# Patient Record
Sex: MALE | Race: WHITE | NOT HISPANIC OR LATINO | Employment: FULL TIME | URBAN - METROPOLITAN AREA
[De-identification: names, ages, dates, MRNs, and addresses within clinical notes are randomized per-mention and may not be internally consistent; named-entity substitution may affect disease eponyms.]

---

## 2024-03-31 ENCOUNTER — ANESTHESIA EVENT (INPATIENT)
Dept: PERIOP | Facility: HOSPITAL | Age: 29
DRG: 343 | End: 2024-03-31
Payer: COMMERCIAL

## 2024-03-31 ENCOUNTER — HOSPITAL ENCOUNTER (INPATIENT)
Facility: HOSPITAL | Age: 29
LOS: 1 days | Discharge: HOME | DRG: 343 | End: 2024-04-01
Attending: EMERGENCY MEDICINE | Admitting: STUDENT IN AN ORGANIZED HEALTH CARE EDUCATION/TRAINING PROGRAM
Payer: COMMERCIAL

## 2024-03-31 ENCOUNTER — APPOINTMENT (EMERGENCY)
Dept: RADIOLOGY | Facility: HOSPITAL | Age: 29
DRG: 343 | End: 2024-03-31
Payer: COMMERCIAL

## 2024-03-31 ENCOUNTER — ANESTHESIA (INPATIENT)
Dept: PERIOP | Facility: HOSPITAL | Age: 29
DRG: 343 | End: 2024-03-31
Payer: COMMERCIAL

## 2024-03-31 DIAGNOSIS — K35.80 ACUTE APPENDICITIS: Primary | ICD-10-CM

## 2024-03-31 LAB
ALBUMIN SERPL BCP-MCNC: 4.4 G/DL (ref 3.5–5)
ALP SERPL-CCNC: 63 U/L (ref 34–104)
ALT SERPL W P-5'-P-CCNC: 40 U/L (ref 7–52)
ANION GAP SERPL CALCULATED.3IONS-SCNC: 10 MMOL/L (ref 4–13)
AST SERPL W P-5'-P-CCNC: 20 U/L (ref 13–39)
BASOPHILS # BLD AUTO: 0.03 THOUSANDS/ÂΜL (ref 0–0.1)
BASOPHILS NFR BLD AUTO: 0 % (ref 0–1)
BILIRUB SERPL-MCNC: 0.79 MG/DL (ref 0.2–1)
BILIRUB UR QL STRIP: NEGATIVE
BUN SERPL-MCNC: 11 MG/DL (ref 5–25)
CALCIUM SERPL-MCNC: 8.8 MG/DL (ref 8.4–10.2)
CHLORIDE SERPL-SCNC: 101 MMOL/L (ref 96–108)
CLARITY UR: CLEAR
CO2 SERPL-SCNC: 25 MMOL/L (ref 21–32)
COLOR UR: COLORLESS
CREAT SERPL-MCNC: 0.98 MG/DL (ref 0.6–1.3)
EOSINOPHIL # BLD AUTO: 0.24 THOUSAND/ÂΜL (ref 0–0.61)
EOSINOPHIL NFR BLD AUTO: 2 % (ref 0–6)
ERYTHROCYTE [DISTWIDTH] IN BLOOD BY AUTOMATED COUNT: 11.5 % (ref 11.6–15.1)
GFR SERPL CREATININE-BSD FRML MDRD: 103 ML/MIN/1.73SQ M
GLUCOSE SERPL-MCNC: 109 MG/DL (ref 65–140)
GLUCOSE UR STRIP-MCNC: NEGATIVE MG/DL
HCT VFR BLD AUTO: 47.7 % (ref 36.5–49.3)
HGB BLD-MCNC: 17 G/DL (ref 12–17)
HGB UR QL STRIP.AUTO: NEGATIVE
IMM GRANULOCYTES # BLD AUTO: 0.04 THOUSAND/UL (ref 0–0.2)
IMM GRANULOCYTES NFR BLD AUTO: 0 % (ref 0–2)
KETONES UR STRIP-MCNC: NEGATIVE MG/DL
LEUKOCYTE ESTERASE UR QL STRIP: NEGATIVE
LIPASE SERPL-CCNC: 16 U/L (ref 11–82)
LYMPHOCYTES # BLD AUTO: 1.63 THOUSANDS/ÂΜL (ref 0.6–4.47)
LYMPHOCYTES NFR BLD AUTO: 12 % (ref 14–44)
MCH RBC QN AUTO: 31.3 PG (ref 26.8–34.3)
MCHC RBC AUTO-ENTMCNC: 35.6 G/DL (ref 31.4–37.4)
MCV RBC AUTO: 88 FL (ref 82–98)
MONOCYTES # BLD AUTO: 0.92 THOUSAND/ÂΜL (ref 0.17–1.22)
MONOCYTES NFR BLD AUTO: 7 % (ref 4–12)
NEUTROPHILS # BLD AUTO: 11.04 THOUSANDS/ÂΜL (ref 1.85–7.62)
NEUTS SEG NFR BLD AUTO: 79 % (ref 43–75)
NITRITE UR QL STRIP: NEGATIVE
NRBC BLD AUTO-RTO: 0 /100 WBCS
PH UR STRIP.AUTO: 7 [PH]
PLATELET # BLD AUTO: 278 THOUSANDS/UL (ref 149–390)
PMV BLD AUTO: 9.1 FL (ref 8.9–12.7)
POTASSIUM SERPL-SCNC: 3.9 MMOL/L (ref 3.5–5.3)
PROT SERPL-MCNC: 7.1 G/DL (ref 6.4–8.4)
PROT UR STRIP-MCNC: NEGATIVE MG/DL
RBC # BLD AUTO: 5.44 MILLION/UL (ref 3.88–5.62)
SODIUM SERPL-SCNC: 136 MMOL/L (ref 135–147)
SP GR UR STRIP.AUTO: 1.01 (ref 1–1.03)
UROBILINOGEN UR STRIP-ACNC: <2 MG/DL
WBC # BLD AUTO: 13.9 THOUSAND/UL (ref 4.31–10.16)

## 2024-03-31 PROCEDURE — 99223 1ST HOSP IP/OBS HIGH 75: CPT | Performed by: STUDENT IN AN ORGANIZED HEALTH CARE EDUCATION/TRAINING PROGRAM

## 2024-03-31 PROCEDURE — 44970 LAPAROSCOPY APPENDECTOMY: CPT | Performed by: STUDENT IN AN ORGANIZED HEALTH CARE EDUCATION/TRAINING PROGRAM

## 2024-03-31 PROCEDURE — 85025 COMPLETE CBC W/AUTO DIFF WBC: CPT

## 2024-03-31 PROCEDURE — 74177 CT ABD & PELVIS W/CONTRAST: CPT

## 2024-03-31 PROCEDURE — 88304 TISSUE EXAM BY PATHOLOGIST: CPT | Performed by: STUDENT IN AN ORGANIZED HEALTH CARE EDUCATION/TRAINING PROGRAM

## 2024-03-31 PROCEDURE — 83690 ASSAY OF LIPASE: CPT

## 2024-03-31 PROCEDURE — 80053 COMPREHEN METABOLIC PANEL: CPT

## 2024-03-31 PROCEDURE — 36415 COLL VENOUS BLD VENIPUNCTURE: CPT

## 2024-03-31 PROCEDURE — 81003 URINALYSIS AUTO W/O SCOPE: CPT

## 2024-03-31 PROCEDURE — 99284 EMERGENCY DEPT VISIT MOD MDM: CPT

## 2024-03-31 PROCEDURE — 0DTJ4ZZ RESECTION OF APPENDIX, PERCUTANEOUS ENDOSCOPIC APPROACH: ICD-10-PCS | Performed by: STUDENT IN AN ORGANIZED HEALTH CARE EDUCATION/TRAINING PROGRAM

## 2024-03-31 PROCEDURE — 96365 THER/PROPH/DIAG IV INF INIT: CPT

## 2024-03-31 PROCEDURE — NC001 PR NO CHARGE: Performed by: STUDENT IN AN ORGANIZED HEALTH CARE EDUCATION/TRAINING PROGRAM

## 2024-03-31 PROCEDURE — 99285 EMERGENCY DEPT VISIT HI MDM: CPT | Performed by: EMERGENCY MEDICINE

## 2024-03-31 RX ORDER — LIDOCAINE HYDROCHLORIDE 20 MG/ML
INJECTION, SOLUTION EPIDURAL; INFILTRATION; INTRACAUDAL; PERINEURAL AS NEEDED
Status: DISCONTINUED | OUTPATIENT
Start: 2024-03-31 | End: 2024-04-01

## 2024-03-31 RX ORDER — ROCURONIUM BROMIDE 10 MG/ML
INJECTION, SOLUTION INTRAVENOUS AS NEEDED
Status: DISCONTINUED | OUTPATIENT
Start: 2024-03-31 | End: 2024-04-01

## 2024-03-31 RX ORDER — MAGNESIUM HYDROXIDE 1200 MG/15ML
LIQUID ORAL AS NEEDED
Status: DISCONTINUED | OUTPATIENT
Start: 2024-03-31 | End: 2024-04-01 | Stop reason: HOSPADM

## 2024-03-31 RX ORDER — FENTANYL CITRATE 50 UG/ML
INJECTION, SOLUTION INTRAMUSCULAR; INTRAVENOUS AS NEEDED
Status: DISCONTINUED | OUTPATIENT
Start: 2024-03-31 | End: 2024-04-01

## 2024-03-31 RX ORDER — METRONIDAZOLE 500 MG/100ML
500 INJECTION, SOLUTION INTRAVENOUS EVERY 8 HOURS
Status: DISCONTINUED | OUTPATIENT
Start: 2024-03-31 | End: 2024-04-01 | Stop reason: HOSPADM

## 2024-03-31 RX ORDER — MIDAZOLAM HYDROCHLORIDE 2 MG/2ML
INJECTION, SOLUTION INTRAMUSCULAR; INTRAVENOUS AS NEEDED
Status: DISCONTINUED | OUTPATIENT
Start: 2024-03-31 | End: 2024-04-01

## 2024-03-31 RX ORDER — PROPOFOL 10 MG/ML
INJECTION, EMULSION INTRAVENOUS CONTINUOUS PRN
Status: DISCONTINUED | OUTPATIENT
Start: 2024-03-31 | End: 2024-04-01

## 2024-03-31 RX ORDER — ONDANSETRON 2 MG/ML
4 INJECTION INTRAMUSCULAR; INTRAVENOUS EVERY 6 HOURS PRN
Status: DISCONTINUED | OUTPATIENT
Start: 2024-03-31 | End: 2024-04-01 | Stop reason: HOSPADM

## 2024-03-31 RX ORDER — SODIUM CHLORIDE, SODIUM LACTATE, POTASSIUM CHLORIDE, CALCIUM CHLORIDE 600; 310; 30; 20 MG/100ML; MG/100ML; MG/100ML; MG/100ML
INJECTION, SOLUTION INTRAVENOUS CONTINUOUS PRN
Status: DISCONTINUED | OUTPATIENT
Start: 2024-03-31 | End: 2024-04-01

## 2024-03-31 RX ORDER — SODIUM CHLORIDE, SODIUM GLUCONATE, SODIUM ACETATE, POTASSIUM CHLORIDE, MAGNESIUM CHLORIDE, SODIUM PHOSPHATE, DIBASIC, AND POTASSIUM PHOSPHATE .53; .5; .37; .037; .03; .012; .00082 G/100ML; G/100ML; G/100ML; G/100ML; G/100ML; G/100ML; G/100ML
1000 INJECTION, SOLUTION INTRAVENOUS ONCE
Status: COMPLETED | OUTPATIENT
Start: 2024-03-31 | End: 2024-03-31

## 2024-03-31 RX ORDER — HEPARIN SODIUM 5000 [USP'U]/ML
5000 INJECTION, SOLUTION INTRAVENOUS; SUBCUTANEOUS EVERY 8 HOURS SCHEDULED
Status: DISCONTINUED | OUTPATIENT
Start: 2024-03-31 | End: 2024-04-01 | Stop reason: HOSPADM

## 2024-03-31 RX ORDER — DEXAMETHASONE SODIUM PHOSPHATE 10 MG/ML
INJECTION, SOLUTION INTRAMUSCULAR; INTRAVENOUS AS NEEDED
Status: DISCONTINUED | OUTPATIENT
Start: 2024-03-31 | End: 2024-04-01

## 2024-03-31 RX ORDER — HYDROMORPHONE HCL/PF 1 MG/ML
0.5 SYRINGE (ML) INJECTION ONCE
Status: DISCONTINUED | OUTPATIENT
Start: 2024-03-31 | End: 2024-04-01 | Stop reason: HOSPADM

## 2024-03-31 RX ADMIN — PROPOFOL 30 MG: 10 INJECTION, EMULSION INTRAVENOUS at 23:42

## 2024-03-31 RX ADMIN — SODIUM CHLORIDE, SODIUM GLUCONATE, SODIUM ACETATE, POTASSIUM CHLORIDE, MAGNESIUM CHLORIDE, SODIUM PHOSPHATE, DIBASIC, AND POTASSIUM PHOSPHATE 1000 ML: .53; .5; .37; .037; .03; .012; .00082 INJECTION, SOLUTION INTRAVENOUS at 20:00

## 2024-03-31 RX ADMIN — DEXAMETHASONE SODIUM PHOSPHATE 10 MG: 10 INJECTION, SOLUTION INTRAMUSCULAR; INTRAVENOUS at 23:19

## 2024-03-31 RX ADMIN — FENTANYL CITRATE 50 MCG: 50 INJECTION INTRAMUSCULAR; INTRAVENOUS at 23:07

## 2024-03-31 RX ADMIN — LIDOCAINE HYDROCHLORIDE 100 MG: 20 INJECTION, SOLUTION EPIDURAL; INFILTRATION; INTRACAUDAL at 23:07

## 2024-03-31 RX ADMIN — PROPOFOL 200 MG: 10 INJECTION, EMULSION INTRAVENOUS at 23:07

## 2024-03-31 RX ADMIN — MIDAZOLAM 2 MG: 1 INJECTION INTRAMUSCULAR; INTRAVENOUS at 23:02

## 2024-03-31 RX ADMIN — IOHEXOL 100 ML: 350 INJECTION, SOLUTION INTRAVENOUS at 21:12

## 2024-03-31 RX ADMIN — FENTANYL CITRATE 50 MCG: 50 INJECTION INTRAMUSCULAR; INTRAVENOUS at 23:26

## 2024-03-31 RX ADMIN — ROCURONIUM BROMIDE 20 MG: 10 INJECTION, SOLUTION INTRAVENOUS at 23:41

## 2024-03-31 RX ADMIN — SODIUM CHLORIDE 8 MCG: 9 INJECTION, SOLUTION INTRAVENOUS at 23:12

## 2024-03-31 RX ADMIN — SODIUM CHLORIDE 8 MCG: 9 INJECTION, SOLUTION INTRAVENOUS at 23:22

## 2024-03-31 RX ADMIN — METRONIDAZOLE: 500 INJECTION, SOLUTION INTRAVENOUS at 23:25

## 2024-03-31 RX ADMIN — SODIUM CHLORIDE 8 MCG: 9 INJECTION, SOLUTION INTRAVENOUS at 23:45

## 2024-03-31 RX ADMIN — Medication 1000 MG: at 23:13

## 2024-03-31 RX ADMIN — PROPOFOL 80 MCG/KG/MIN: 10 INJECTION, EMULSION INTRAVENOUS at 23:13

## 2024-03-31 RX ADMIN — SODIUM CHLORIDE, SODIUM LACTATE, POTASSIUM CHLORIDE, AND CALCIUM CHLORIDE: .6; .31; .03; .02 INJECTION, SOLUTION INTRAVENOUS at 23:00

## 2024-03-31 RX ADMIN — ROCURONIUM BROMIDE 40 MG: 10 INJECTION, SOLUTION INTRAVENOUS at 23:07

## 2024-03-31 NOTE — ED PROVIDER NOTES
Chief Complaint   Patient presents with    Abdominal Pain     Pt reports RLQ pain since last night. -N/V/D/. States dizziness      History of Present Illness and Review of Systems   This is a 28 y.o. male with no pmhx coming in today with complaint of right lower quadrant abdominal pain starting last night.  The patient states that this started after he had a Korean barbecue food during his visit in [place].  The patient recently drove back from the area and he started to have pain in his right lower quadrant.  The patient denies any fever chills nausea vomiting or diarrhea.  Patient's wife had similar food without any of the same symptoms.  The patient denies any dysuria or hematuria.  He states that the pain is not made worse with positional changes or with eating however he states that he is having a hard time tolerative po food/liquids    - No language barrier.     No other complaints for this encounter.    Remainder of ROS Reviewed and Non-Pertinent    Past Medical, Past Surgical History:    has no past medical history on file.   has a past surgical history that includes APPENDECTOMY LAPAROSCOPIC (N/A, 3/31/2024).     Allergies:   No Known Allergies    Social and Family History:     Social History     Substance and Sexual Activity   Alcohol Use None     Social History     Tobacco Use   Smoking Status Never   Smokeless Tobacco Never     Social History     Substance and Sexual Activity   Drug Use Not on file       Physical Examination     Vitals:    04/01/24 0255 04/01/24 0421 04/01/24 0546 04/01/24 0747   BP: 118/71 117/70 115/70 105/61   Pulse: 79 82 86 90   Resp: 18 18  18   Temp: 97.8 °F (36.6 °C) 98.5 °F (36.9 °C) 98.8 °F (37.1 °C) 98.7 °F (37.1 °C)   TempSrc:       SpO2: 93% 95% 94% 94%   Weight:       Height:           Physical Exam  Vitals and nursing note reviewed.   Constitutional:       General: He is not in acute distress.     Appearance: He is well-developed.   HENT:      Head: Normocephalic  and atraumatic.   Eyes:      Extraocular Movements: Extraocular movements intact.      Conjunctiva/sclera: Conjunctivae normal.      Pupils: Pupils are equal, round, and reactive to light.   Cardiovascular:      Rate and Rhythm: Normal rate and regular rhythm.      Heart sounds: No murmur heard.  Pulmonary:      Effort: Pulmonary effort is normal. No respiratory distress.      Breath sounds: Normal breath sounds.   Abdominal:      General: Abdomen is flat.      Palpations: Abdomen is soft. There is no shifting dullness, fluid wave, hepatomegaly, splenomegaly, mass or pulsatile mass.      Tenderness: There is abdominal tenderness in the right lower quadrant. There is no right CVA tenderness, left CVA tenderness, guarding or rebound. Positive signs include McBurney's sign. Negative signs include Ellison's sign, Rovsing's sign, psoas sign and obturator sign.      Hernia: No hernia is present.   Musculoskeletal:         General: No swelling.      Cervical back: Neck supple.   Skin:     General: Skin is warm and dry.      Capillary Refill: Capillary refill takes less than 2 seconds.   Neurological:      Mental Status: He is alert.   Psychiatric:         Mood and Affect: Mood normal.           Procedures   Procedures      MDM:   Medical Decision Making  Amount and/or Complexity of Data Reviewed  Labs: ordered. Decision-making details documented in ED Course.  Radiology: ordered.    Risk  Prescription drug management.  Decision regarding hospitalization.    28-year-old with new onset right lower quadrant abdominal pain.  Patient has positive McBurney's point but no overall skin for psoas or obturator sign.  Will work the patient up for possible appendicitis.  Differentials include ureterovesicular junction kidney stone or constipation as well as SBO or gastroenteritis.    Patient will get a CT of his abdomen with contrast.  This came back positive for appendicitis.  The patient was admitted to surgery for appendectomy    -  Reviewed relevant past office visits/hospitalizations/procedures  -Obtained pertinent history that influenced decision making from the     ED Course as of 04/03/24 1736   Sun Mar 31, 2024   2014 WBC(!): 13.90      Final Dispo   Final Diagnosis:  1. Acute appendicitis      Time reflects when diagnosis was documented in both MDM as applicable and the Disposition within this note       Time User Action Codes Description Comment    3/31/2024 10:18 PM Williams Wolff Add [K35.80] Acute appendicitis     4/1/2024 12:07 AM José Luis Lopez Modify [K35.80] Acute appendicitis           ED Disposition       ED Disposition   Admit    Condition   Stable    Date/Time   Sun Mar 31, 2024 10:18 PM    Comment   Admit to surgery               Follow-up Information       Follow up With Specialties Details Why Contact Info Additional Information    [facility] Surgery Follow up Please make an appointment in two weeks. - [address] [id number] [phone] [facility], [facility] [address], [id number] [phone]    Primary Care Provider  Schedule an appointment as soon as possible for a visit in 2 week(s)             Medications   multi-electrolyte (ISOLYTE-S PH 7.4) bolus 1,000 mL (0 mL Intravenous Stopped 3/31/24 2126)   iohexol (OMNIPAQUE) 350 MG/ML injection (MULTI-DOSE) 100 mL (100 mL Intravenous Given 3/31/24 2112)   ceftriaxone (ROCEPHIN) 1 g/50 mL in dextrose IVPB ( Intravenous MAR Unhold 3/31/24 2318)       Risk Stratification Tools                Orders Placed This Encounter   Procedures    CT abdomen pelvis w contrast    CBC and differential    Comprehensive metabolic panel    Lipase    UA w Reflex to Microscopic w Reflex to Culture    CBC and differential    Basic metabolic panel    Discharge Diet    Insert peripheral IV    Activity as tolerated    No strenuous exercise    Call provider for:  persistent nausea  or vomiting    Call provider for:  severe uncontrolled pain    Call provider for:  redness, tenderness, or signs of infection (pain, swelling, redness, odor or green/yellow discharge around incision site)    Call provider for:  difficulty breathing, headache or visual disturbances    Call provider for:  persistent dizziness or light-headedness    Inpatient Admission    Discharge patient       Labs:     Labs Reviewed   CBC AND DIFFERENTIAL - Abnormal       Result Value Ref Range Status    WBC 13.90 (*) 4.31 - 10.16 Thousand/uL Final    RBC 5.44  3.88 - 5.62 Million/uL Final    Hemoglobin 17.0  12.0 - 17.0 g/dL Final    Hematocrit 47.7  36.5 - 49.3 % Final    MCV 88  82 - 98 fL Final    MCH 31.3  26.8 - 34.3 pg Final    MCHC 35.6  31.4 - 37.4 g/dL Final    RDW 11.5 (*) 11.6 - 15.1 % Final    MPV 9.1  8.9 - 12.7 fL Final    Platelets 278  149 - 390 Thousands/uL Final    nRBC 0  /100 WBCs Final    Neutrophils Relative 79 (*) 43 - 75 % Final    Immature Grans % 0  0 - 2 % Final    Lymphocytes Relative 12 (*) 14 - 44 % Final    Monocytes Relative 7  4 - 12 % Final    Eosinophils Relative 2  0 - 6 % Final    Basophils Relative 0  0 - 1 % Final    Neutrophils Absolute 11.04 (*) 1.85 - 7.62 Thousands/µL Final    Absolute Immature Grans 0.04  0.00 - 0.20 Thousand/uL Final    Absolute Lymphocytes 1.63  0.60 - 4.47 Thousands/µL Final    Absolute Monocytes 0.92  0.17 - 1.22 Thousand/µL Final    Eosinophils Absolute 0.24  0.00 - 0.61 Thousand/µL Final    Basophils Absolute 0.03  0.00 - 0.10 Thousands/µL Final   LIPASE - Normal    Lipase 16  11 - 82 u/L Final   COMPREHENSIVE METABOLIC PANEL    Sodium 136  135 - 147 mmol/L Final    Potassium 3.9  3.5 - 5.3 mmol/L Final    Chloride 101  96 - 108 mmol/L Final    CO2 25  21 - 32 mmol/L Final    ANION GAP 10  4 - 13 mmol/L Final    BUN 11  5 - 25 mg/dL Final    Creatinine 0.98  0.60 - 1.30 mg/dL Final    Comment: Standardized to IDMS reference method    Glucose 109  65 - 140 mg/dL  Final    Comment: If the patient is fasting, the ADA then defines impaired fasting glucose as > 100 mg/dL and diabetes as > or equal to 123 mg/dL.    Calcium 8.8  8.4 - 10.2 mg/dL Final    AST 20  13 - 39 U/L Final    ALT 40  7 - 52 U/L Final    Comment: Specimen collection should occur prior to Sulfasalazine administration due to the potential for falsely depressed results.     Alkaline Phosphatase 63  34 - 104 U/L Final    Total Protein 7.1  6.4 - 8.4 g/dL Final    Albumin 4.4  3.5 - 5.0 g/dL Final    Total Bilirubin 0.79  0.20 - 1.00 mg/dL Final    Comment: Use of this assay is not recommended for patients undergoing treatment with eltrombopag due to the potential for falsely elevated results.  N-acetyl-p-benzoquinone imine (metabolite of Acetaminophen) will generate erroneously low results in samples for patients that have taken an overdose of Acetaminophen.    eGFR 103  ml/min/1.73sq m Final    Narrative:     National Kidney Disease Foundation guidelines for Chronic Kidney Disease (CKD):     Stage 1 with normal or high GFR (GFR > 90 mL/min/1.73 square meters)    Stage 2 Mild CKD (GFR = 60-89 mL/min/1.73 square meters)    Stage 3A Moderate CKD (GFR = 45-59 mL/min/1.73 square meters)    Stage 3B Moderate CKD (GFR = 30-44 mL/min/1.73 square meters)    Stage 4 Severe CKD (GFR = 15-29 mL/min/1.73 square meters)    Stage 5 End Stage CKD (GFR <15 mL/min/1.73 square meters)  Note: GFR calculation is accurate only with a steady state creatinine   UA W REFLEX TO MICROSCOPIC WITH REFLEX TO CULTURE    Color, UA Colorless   Final    Clarity, UA Clear   Final    Specific Gravity, UA 1.010  1.003 - 1.030 Final    pH, UA 7.0  4.5, 5.0, 5.5, 6.0, 6.5, 7.0, 7.5, 8.0 Final    Leukocytes, UA Negative  Negative Final    Nitrite, UA Negative  Negative Final    Protein, UA Negative  Negative mg/dl Final    Glucose, UA Negative  Negative mg/dl Final    Ketones, UA Negative  Negative mg/dl Final    Urobilinogen, UA <2.0  <2.0 mg/dl  "mg/dl Final    Bilirubin, UA Negative  Negative Final    Occult Blood, UA Negative  Negative Final       Imaging:     CT abdomen pelvis w contrast   Final Result by Titi Coker DO (03/31 2217)   Addendum (preliminary) 1 of 1 by Titi Coker DO (03/31 2217)   ADDENDUM:   Successfully communicated the impression of appendicitis to Heath Terese    on 3/31/2024 via [place] text 10:08 p.m.      I personally discussed this    study with Dr. Wolff on 3/31/2024 10:16 PM.                     Final   Acute appendicitis. No obvious perforation or abscess.      Attempting to contact referring physician.      Workstation performed: [id number]            All details of the evaluation and treatment plan were made clear and additionally all questions and concerns were addressed while under my care.    Portions of the record may have been created with voice recognition software. Occasional wrong word or \"sound a like\" substitutions may have occurred due to the inherent limitations of voice recognition software. Read the chart carefully and recognize, using context, where substitutions have occurred.    The attending physician physically available and evaluated the above patient alongside myself.      Chris London MD  04/03/24 1736       Chris London MD  04/03/24 1736    "

## 2024-04-01 VITALS
RESPIRATION RATE: 18 BRPM | HEIGHT: 66 IN | BODY MASS INDEX: 29.97 KG/M2 | DIASTOLIC BLOOD PRESSURE: 61 MMHG | TEMPERATURE: 98.7 F | HEART RATE: 90 BPM | OXYGEN SATURATION: 94 % | SYSTOLIC BLOOD PRESSURE: 105 MMHG | WEIGHT: 186.51 LBS

## 2024-04-01 PROBLEM — K35.80 ACUTE APPENDICITIS: Status: ACTIVE | Noted: 2024-04-01

## 2024-04-01 LAB
ANION GAP SERPL CALCULATED.3IONS-SCNC: 11 MMOL/L (ref 4–13)
BASOPHILS # BLD AUTO: 0.01 THOUSANDS/ÂΜL (ref 0–0.1)
BASOPHILS NFR BLD AUTO: 0 % (ref 0–1)
BUN SERPL-MCNC: 7 MG/DL (ref 5–25)
CALCIUM SERPL-MCNC: 8.4 MG/DL (ref 8.4–10.2)
CHLORIDE SERPL-SCNC: 102 MMOL/L (ref 96–108)
CO2 SERPL-SCNC: 23 MMOL/L (ref 21–32)
CREAT SERPL-MCNC: 0.85 MG/DL (ref 0.6–1.3)
EOSINOPHIL # BLD AUTO: 0 THOUSAND/ÂΜL (ref 0–0.61)
EOSINOPHIL NFR BLD AUTO: 0 % (ref 0–6)
ERYTHROCYTE [DISTWIDTH] IN BLOOD BY AUTOMATED COUNT: 11.4 % (ref 11.6–15.1)
GFR SERPL CREATININE-BSD FRML MDRD: 117 ML/MIN/1.73SQ M
GLUCOSE SERPL-MCNC: 167 MG/DL (ref 65–140)
HCT VFR BLD AUTO: 45.8 % (ref 36.5–49.3)
HGB BLD-MCNC: 16.4 G/DL (ref 12–17)
IMM GRANULOCYTES # BLD AUTO: 0.08 THOUSAND/UL (ref 0–0.2)
IMM GRANULOCYTES NFR BLD AUTO: 1 % (ref 0–2)
LYMPHOCYTES # BLD AUTO: 0.68 THOUSANDS/ÂΜL (ref 0.6–4.47)
LYMPHOCYTES NFR BLD AUTO: 4 % (ref 14–44)
MCH RBC QN AUTO: 31.8 PG (ref 26.8–34.3)
MCHC RBC AUTO-ENTMCNC: 35.8 G/DL (ref 31.4–37.4)
MCV RBC AUTO: 89 FL (ref 82–98)
MONOCYTES # BLD AUTO: 0.23 THOUSAND/ÂΜL (ref 0.17–1.22)
MONOCYTES NFR BLD AUTO: 2 % (ref 4–12)
NEUTROPHILS # BLD AUTO: 14.43 THOUSANDS/ÂΜL (ref 1.85–7.62)
NEUTS SEG NFR BLD AUTO: 93 % (ref 43–75)
NRBC BLD AUTO-RTO: 0 /100 WBCS
PLATELET # BLD AUTO: 272 THOUSANDS/UL (ref 149–390)
PLATELET BLD QL SMEAR: ADEQUATE
PMV BLD AUTO: 9.6 FL (ref 8.9–12.7)
POTASSIUM SERPL-SCNC: 4.1 MMOL/L (ref 3.5–5.3)
RBC # BLD AUTO: 5.16 MILLION/UL (ref 3.88–5.62)
RBC MORPH BLD: NORMAL
SODIUM SERPL-SCNC: 136 MMOL/L (ref 135–147)
WBC # BLD AUTO: 15.43 THOUSAND/UL (ref 4.31–10.16)

## 2024-04-01 PROCEDURE — 85025 COMPLETE CBC W/AUTO DIFF WBC: CPT

## 2024-04-01 PROCEDURE — 99024 POSTOP FOLLOW-UP VISIT: CPT | Performed by: PHYSICIAN ASSISTANT

## 2024-04-01 PROCEDURE — 80048 BASIC METABOLIC PNL TOTAL CA: CPT

## 2024-04-01 PROCEDURE — NC001 PR NO CHARGE: Performed by: SURGERY

## 2024-04-01 RX ORDER — NEOSTIGMINE METHYLSULFATE 1 MG/ML
INJECTION INTRAVENOUS AS NEEDED
Status: DISCONTINUED | OUTPATIENT
Start: 2024-04-01 | End: 2024-04-01

## 2024-04-01 RX ORDER — FENTANYL CITRATE/PF 50 MCG/ML
25 SYRINGE (ML) INJECTION
Status: DISCONTINUED | OUTPATIENT
Start: 2024-04-01 | End: 2024-04-01 | Stop reason: HOSPADM

## 2024-04-01 RX ORDER — MAGNESIUM HYDROXIDE 1200 MG/15ML
LIQUID ORAL AS NEEDED
Status: DISCONTINUED | OUTPATIENT
Start: 2024-04-01 | End: 2024-04-01 | Stop reason: HOSPADM

## 2024-04-01 RX ORDER — OXYCODONE HYDROCHLORIDE 5 MG/1
5 TABLET ORAL EVERY 4 HOURS PRN
Status: DISCONTINUED | OUTPATIENT
Start: 2024-04-01 | End: 2024-04-01 | Stop reason: HOSPADM

## 2024-04-01 RX ORDER — ALBUTEROL SULFATE 2.5 MG/3ML
2.5 SOLUTION RESPIRATORY (INHALATION) ONCE AS NEEDED
Status: DISCONTINUED | OUTPATIENT
Start: 2024-04-01 | End: 2024-04-01 | Stop reason: HOSPADM

## 2024-04-01 RX ORDER — HYDROMORPHONE HCL/PF 1 MG/ML
SYRINGE (ML) INJECTION AS NEEDED
Status: DISCONTINUED | OUTPATIENT
Start: 2024-04-01 | End: 2024-04-01

## 2024-04-01 RX ORDER — OXYCODONE HYDROCHLORIDE 5 MG/1
5 TABLET ORAL EVERY 4 HOURS PRN
Qty: 15 TABLET | Refills: 0 | Status: SHIPPED | OUTPATIENT
Start: 2024-04-01 | End: 2024-04-11

## 2024-04-01 RX ORDER — GLYCOPYRROLATE 0.2 MG/ML
INJECTION INTRAMUSCULAR; INTRAVENOUS AS NEEDED
Status: DISCONTINUED | OUTPATIENT
Start: 2024-04-01 | End: 2024-04-01

## 2024-04-01 RX ORDER — OXYCODONE HYDROCHLORIDE 10 MG/1
10 TABLET ORAL EVERY 4 HOURS PRN
Status: DISCONTINUED | OUTPATIENT
Start: 2024-04-01 | End: 2024-04-01 | Stop reason: HOSPADM

## 2024-04-01 RX ORDER — SODIUM CHLORIDE, SODIUM GLUCONATE, SODIUM ACETATE, POTASSIUM CHLORIDE, MAGNESIUM CHLORIDE, SODIUM PHOSPHATE, DIBASIC, AND POTASSIUM PHOSPHATE .53; .5; .37; .037; .03; .012; .00082 G/100ML; G/100ML; G/100ML; G/100ML; G/100ML; G/100ML; G/100ML
125 INJECTION, SOLUTION INTRAVENOUS CONTINUOUS
Status: DISCONTINUED | OUTPATIENT
Start: 2024-04-01 | End: 2024-04-01

## 2024-04-01 RX ORDER — HYDROMORPHONE HCL/PF 1 MG/ML
0.5 SYRINGE (ML) INJECTION EVERY 4 HOURS PRN
Status: DISCONTINUED | OUTPATIENT
Start: 2024-04-01 | End: 2024-04-01

## 2024-04-01 RX ORDER — ONDANSETRON 2 MG/ML
INJECTION INTRAMUSCULAR; INTRAVENOUS AS NEEDED
Status: DISCONTINUED | OUTPATIENT
Start: 2024-04-01 | End: 2024-04-01

## 2024-04-01 RX ORDER — DIPHENHYDRAMINE HYDROCHLORIDE 50 MG/ML
12.5 INJECTION INTRAMUSCULAR; INTRAVENOUS ONCE AS NEEDED
Status: DISCONTINUED | OUTPATIENT
Start: 2024-04-01 | End: 2024-04-01 | Stop reason: HOSPADM

## 2024-04-01 RX ORDER — ONDANSETRON 2 MG/ML
4 INJECTION INTRAMUSCULAR; INTRAVENOUS ONCE AS NEEDED
Status: DISCONTINUED | OUTPATIENT
Start: 2024-04-01 | End: 2024-04-01 | Stop reason: HOSPADM

## 2024-04-01 RX ORDER — HYDROMORPHONE HCL/PF 1 MG/ML
0.4 SYRINGE (ML) INJECTION
Status: DISCONTINUED | OUTPATIENT
Start: 2024-04-01 | End: 2024-04-01 | Stop reason: HOSPADM

## 2024-04-01 RX ORDER — ACETAMINOPHEN 325 MG/1
975 TABLET ORAL EVERY 8 HOURS SCHEDULED
Status: DISCONTINUED | OUTPATIENT
Start: 2024-04-01 | End: 2024-04-01 | Stop reason: HOSPADM

## 2024-04-01 RX ORDER — ACETAMINOPHEN 325 MG/1
650 TABLET ORAL EVERY 6 HOURS PRN
Start: 2024-04-01

## 2024-04-01 RX ADMIN — OXYCODONE HYDROCHLORIDE 5 MG: 5 TABLET ORAL at 10:11

## 2024-04-01 RX ADMIN — ONDANSETRON 4 MG: 2 INJECTION INTRAMUSCULAR; INTRAVENOUS at 00:30

## 2024-04-01 RX ADMIN — HYDROMORPHONE HYDROCHLORIDE 1 MG: 1 INJECTION, SOLUTION INTRAMUSCULAR; INTRAVENOUS; SUBCUTANEOUS at 00:20

## 2024-04-01 RX ADMIN — SODIUM CHLORIDE, SODIUM GLUCONATE, SODIUM ACETATE, POTASSIUM CHLORIDE, MAGNESIUM CHLORIDE, SODIUM PHOSPHATE, DIBASIC, AND POTASSIUM PHOSPHATE 100 ML/HR: .53; .5; .37; .037; .03; .012; .00082 INJECTION, SOLUTION INTRAVENOUS at 02:43

## 2024-04-01 RX ADMIN — SODIUM CHLORIDE 8 MCG: 9 INJECTION, SOLUTION INTRAVENOUS at 01:01

## 2024-04-01 RX ADMIN — GLYCOPYRROLATE 0.4 MG: 0.2 INJECTION, SOLUTION INTRAMUSCULAR; INTRAVENOUS at 00:33

## 2024-04-01 RX ADMIN — NEOSTIGMINE METHYLSULFATE 3 MG: 1 INJECTION INTRAVENOUS at 00:33

## 2024-04-01 RX ADMIN — ACETAMINOPHEN 975 MG: 325 TABLET, FILM COATED ORAL at 01:53

## 2024-04-01 RX ADMIN — HEPARIN SODIUM 5000 UNITS: 5000 INJECTION INTRAVENOUS; SUBCUTANEOUS at 05:26

## 2024-04-01 RX ADMIN — METRONIDAZOLE 500 MG: 500 INJECTION, SOLUTION INTRAVENOUS at 05:26

## 2024-04-01 RX ADMIN — SODIUM CHLORIDE 8 MCG: 9 INJECTION, SOLUTION INTRAVENOUS at 00:53

## 2024-04-01 RX ADMIN — ACETAMINOPHEN 975 MG: 325 TABLET, FILM COATED ORAL at 08:48

## 2024-04-01 RX ADMIN — OXYCODONE HYDROCHLORIDE 10 MG: 10 TABLET ORAL at 04:19

## 2024-04-01 RX ADMIN — HYDROMORPHONE HYDROCHLORIDE 0.5 MG: 1 INJECTION, SOLUTION INTRAMUSCULAR; INTRAVENOUS; SUBCUTANEOUS at 05:33

## 2024-04-01 NOTE — PROGRESS NOTES
Progress Note - General Surgery   David Up 28 y.o. male MRN: [id number]  Unit/Bed#: [facility] 629-01 Encounter: [id number]    Assessment:  28-year-old male with acute appendicitis, status post 3/31 laparoscopic appendectomy    Plan:  - Regular diet  - Discontinue IV fluids  - Pain and nausea control PRN  - Encourage IS, ambulation   - Follow-up urine output  - DVT ppx   - Anticipate discharge    Subjective:  Arrived on floor approximately 1 AM.  No acute events since.  Pain adequately controlled.  Tolerated clear liquid diet following the OR. Patient denies nausea, vomiting, fever, chills, chest pain, shortness of breath.     Objective:    Vitals:   Afebrile, Normal VS on room air.  Temp:  [97.2 °F (36.2 °C)-98.8 °F (37.1 °C)] 98.7 °F (37.1 °C)  HR:  [75-90] 90  Resp:  [16-19] 18  BP: (105-152)/(61-80) 105/61  Body mass index is 30.1 kg/m².    Physical Exam:   Gen: NAD, Resting in bed  Neuro: A&O, No focal deficits  Head: Normal Cephalic, Atraumatic  Eye: EOMI, No scleral icterus  CV: Regular rate  Pulm: Normal work of breathing, No respiratory distress on RA  Abd: Soft, mildly distended, appropriately tender.  Incisions clean, dry, intact with mild surrounding ecchymosis.  Ext: No edema bilateral lower extremities, Non-tender  Skin: Warm, Dry, Intact    I/O:  UO: 75 cc recorded since OR    Intake/Output Summary (Last 24 hours) at 4/1/2024 0846  Last data filed at 4/1/2024 0841  Gross per 24 hour   Intake 1505.42 ml   Output 75 ml   Net 1430.42 ml     Lab Results:  Recent Labs     03/31/24 2001 04/01/24  0547   WBC 13.90* 15.43*   HGB 17.0 16.4    272   SODIUM 136 136   K 3.9 4.1    102   CO2 25 23   BUN 11 7   CREATININE 0.98 0.85   GLUC 109 167*   CALCIUM 8.8 8.4   AST 20  --    ALT 40  --    ALKPHOS 63  --    TBILI 0.79  --        ---    Chelsie Copeland MD  General Surgery PGY-I

## 2024-04-01 NOTE — DISCHARGE INSTR - AVS FIRST PAGE
Acute Care Surgery Discharge Instructions    Please follow-up as instructed. If you do not already have a follow-up appointment, please call the office when you leave to schedule an appointment to be seen in 2-3 weeks for post-operative re-evaluation.    Activity:  - No lifting greater than 20 pounds or strenuous physical activity or exercise for 2 weeks.  - Walking and normal light activities are encouraged.  - Normal daily activities including climbing steps are okay.  - No driving until no longer using pain medications.    Return to work:    - You may return to work in 2 weeks or sooner if you are feeling well enough.    Diet:    - You may resume your normal diet.    Wound Care:  - May shower daily. No tub baths or swimming until cleared by your surgeon.  - Wash incision gently with soap and water and pat dry.  - Do not apply any creams or ointments unless instructed to do so by your surgeon.  - You may apply ice as needed (no longer than 20 minutes at a time) for the first 48 hours.  - Bruising is not unusual and will go away with a little time. You may apply a warm, moist compress that may help the bruising resolve quicker.  - You may remove the dressings the day after surgery (unless otherwise instructed). Leave any skin tapes (steri-strips) on the incision(s) in place until they fall off on their own. Any new dressings are optional.    Medications:    - You may resume all of your regular medications after discharge unless otherwise instructed. Please refer to your discharge medication list for further details.  - Please take the pain medications as directed.  - You are encouraged to use non-narcotic pain medications first and whenever possible. Reserve the use of narcotic pain medication for moderate to severe pain not controlled by non-narcotic medications.  - No driving while taking narcotic pain medications.  - You may become constipated, especially if taking pain medications. You may take any over the  counter stool softeners or laxatives as needed. Examples: Milk of Magnesia, Colace, Senna.    Additional Instructions:  - If you have any questions or concerns after discharge please call the office.  - Call office or return to ER if fever greater than 101, chills, persistent nausea/vomiting, worsening/uncontrollable pain, and/or increasing redness or purulent/foul smelling drainage from incision(s).

## 2024-04-01 NOTE — OP NOTE
OPERATIVE REPORT  PATIENT NAME: David Up    :  1995  MRN: [id number]  Pt Location: [facility] OR ROOM 07    SURGERY DATE: 3/31/2024    Surgeons and Role:     * Lauryn Ullrich, DO - Primary     * Susana Frankel DO - Assisting     * Chelsie Copeland MD - Assisting    Preop Diagnosis:  Acute appendicitis [K35.80]    Post-Op Diagnosis Codes:     * Acute appendicitis [K35.80]    Procedure(s):  APPENDECTOMY LAPAROSCOPIC    Specimen(s):  ID Type Source Tests Collected by Time Destination   1 :  Tissue Appendix TISSUE EXAM Lauryn Ullrich, DO 3/31/2024 2338        Estimated Blood Loss:   Minimal    Drains:  Urethral Catheter Latex 16 Fr. (Active)   Collection Container Standard drainage bag 24 2320   Number of days: 1       Anesthesia Type:   General    Operative Indications:  Acute appendicitis [K35.80]      Operative Findings:  Acute inflamed, non-perforated appendix.     Complications:   None    Procedure and Technique:  The patient was brought to the operating room and identified verbally and via wristband. He was transferred to the operating room table and positioned supine. General endotracheal anesthesia was induced successfully. The patient's abdomen was prepped and draped in the usual sterile fashion. Pre-operative antibiotics were administered. A time out was performed confirming the patient, procedure, and site.  All parties were in agreement.    Attention was turned to the abdomen where a 5 mm infraumbilical incision was made with an 11 blade scalpel.  The Veress needle was inserted and CO2 insufflation was initiated.  Opening pressure of 12 was obtained and therefore the needle was removed.  This was repeated 2 more times, and the appropriate opening pressure was not obtained.  Therefore a Jorge obtain access to the abdomen.  The 5 mm trocar was inserted followed by the laparoscope.  The surrounding intra-abdominal contents were inspected for injury upon entry, none were appreciated.  Patient  was positioned appropriately.  A 12 mm left lower quadrant trocar was placed under laparoscopic visualization.  An additional 5 mm suprapubic trocar was placed under laparoscopic visualization.    The small bowel in the right lower quadrant was retracted into the upper abdomen.  The appendix was visualized in the right lower quadrant.  Appendix was acutely inflamed, but nonperforated.  The appendix was grasped with a laparoscopic Allis and retracted caudad.  A few adhesions were taken down bluntly with a [place] grasper.   The base of the appendix was identified at the base of the cecum.  The mesoappendix was visualized.  A window in the mesoappendix was created using a [place] grasper.  An Endo LUIS stapler, white load was used to transect the mesoappendix.  An Endo LUIS stapler, blue load, was used to transect the appendix.  Appendix was placed in an Endo Catch bag.  Staple line was visualized, noted to be hemostatic.  The surrounding reactive fluid was suctioned from the right lower quadrant in the pelvis.  The appendix was then removed from the abdomen.  The fascia of the 12 mm port incision was closed with 0 Vicryl suture on a UR 6.  The skin of each incision was closed with 4 Monocryl and covered with Exofin skin glue.      Technique was utilized toAll instrument, needle, and sponge counts were correct at the end of the case. Radiofrequency detection was negative.    Dr. Ullrich was present for the entire procedure      Patient Disposition:  PACU     This procedure was not performed to treat colon cancer through resection      SIGNATURE: Susana Frankel DO  DATE: April 1, 2024  TIME: 12:35 AM

## 2024-04-01 NOTE — ANESTHESIA POSTPROCEDURE EVALUATION
Post-Op Assessment Note    CV Status:  Stable  Pain Score: 0    Pain management: adequate       Mental Status:  Sleepy and awake   Hydration Status:  Stable   PONV Controlled:  None   Airway Patency:  Patent     Post Op Vitals Reviewed: Yes    No anethesia notable event occurred.    Staff: CRNA               BP  121/62    Temp 97.9   Pulse 89   Resp 16   SpO2 95 RA

## 2024-04-01 NOTE — INCIDENTAL FINDINGS
The following findings require follow up:  Radiographic finding   Findin. LIVER/BILIARY TREE: Mildly enlarged.      Follow up required: Yes   Follow up should be done within 2-4 week(s)    Please notify the following clinician to assist with the follow up:   Primary Care Provider. Discussed with patient at bedside. Patient expressed verbal understanding of the above findings.

## 2024-04-01 NOTE — PLAN OF CARE
Problem: PAIN - ADULT  Goal: Verbalizes/displays adequate comfort level or baseline comfort level  Description: Interventions:  - Encourage patient to monitor pain and request assistance  - Assess pain using appropriate pain scale  - Administer analgesics based on type and severity of pain and evaluate response  - Implement non-pharmacological measures as appropriate and evaluate response  - Consider cultural and social influences on pain and pain management  - Notify physician/advanced practitioner if interventions unsuccessful or patient reports new pain  4/1/2024 1036 by Luz Barrios RN  Outcome: Completed  4/1/2024 0750 by Luz Barrios RN  Outcome: Progressing     Problem: INFECTION - ADULT  Goal: Absence or prevention of progression during hospitalization  Description: INTERVENTIONS:  - Assess and monitor for signs and symptoms of infection  - Monitor lab/diagnostic results  - Monitor all insertion sites, i.e. indwelling lines, tubes, and drains  - Monitor endotracheal if appropriate and nasal secretions for changes in amount and color  - [place] appropriate cooling/warming therapies per order  - Administer medications as ordered  - Instruct and encourage patient and family to use good hand hygiene technique  - Identify and instruct in appropriate isolation precautions for identified infection/condition  4/1/2024 1036 by Luz Barrios RN  Outcome: Completed  4/1/2024 0750 by Luz Barrios RN  Outcome: Progressing  Goal: Absence of fever/infection during neutropenic period  Description: INTERVENTIONS:  - Monitor WBC    4/1/2024 1036 by Luz Barrios RN  Outcome: Completed  4/1/2024 0750 by Luz Barrios RN  Outcome: Progressing     Problem: DISCHARGE PLANNING  Goal: Discharge to home or other facility with appropriate resources  Description: INTERVENTIONS:  - Identify barriers to discharge w/patient and caregiver  - Arrange for needed discharge resources and transportation as appropriate  -  Identify discharge learning needs (meds, wound care, etc.)  - Arrange for interpretive services to assist at discharge as needed  - Refer to Case Management Department for coordinating discharge planning if the patient needs post-hospital services based on physician/advanced practitioner order or complex needs related to functional status, cognitive ability, or social support system  4/1/2024 1036 by Luz Barrios RN  Outcome: Completed  4/1/2024 0750 by uLz Barrios RN  Outcome: Progressing     Problem: Knowledge Deficit  Goal: Patient/family/caregiver demonstrates understanding of disease process, treatment plan, medications, and discharge instructions  Description: Complete learning assessment and assess knowledge base.  Interventions:  - Provide teaching at level of understanding  - Provide teaching via preferred learning methods  4/1/2024 1036 by Luz Barrios RN  Outcome: Completed  4/1/2024 0750 by Luz Barrios RN  Outcome: Progressing

## 2024-04-01 NOTE — UTILIZATION REVIEW
Initial Clinical Review    Admission: Date/Time/Statement:   Admission Orders (From admission, onward)       Ordered        03/31/24 2302  Inpatient Admission  Once                          Orders Placed This Encounter   Procedures    Inpatient Admission     Standing Status:   Standing     Number of Occurrences:   1     Order Specific Question:   Level of Care     Answer:   Med Surg [16]     Order Specific Question:   Estimated length of stay     Answer:   More than 2 Midnights     Order Specific Question:   Certification     Answer:   I certify that inpatient services are medically necessary for this patient for a duration of greater than two midnights. See H&P and MD Progress Notes for additional information about the patient's course of treatment.     ED Arrival Information       Expected   -    Arrival   3/31/2024 19:25    Acuity   Urgent              Means of arrival   Walk-In    Escorted by   Self    Service   Surgery-General    Admission type   Emergency              Arrival complaint   Abdominal Pain             Chief Complaint   Patient presents with    Abdominal Pain     Pt reports RLQ pain since last night. -N/V/D/. States dizziness        Initial Presentation: 28 y.o. male to ED presents for RLQ abdominal pain since last evening. Nausea and chills . Anorexia. Last eat approx. 7 yrs ago. Occupation involves heavy lifting.   Admit Inpatient level of care for Acute Appendicitis. Plan for OR. NPO and IVFs. Pain and nausea control prn.     3/31 OR - S/p APPENDECTOMY LAPAROSCOPIC   Operative Findings:  Acute inflamed, non-perforated appendix.     Date: 4/1   Day 2:   Progress notes; POD #1. Tolerating clear liquid diet. Regular diet. D/c IVFs. Pain and nausea control prn.     Incidental finding; LIVER/BILIARY TREE: Mildly enlarged.    F/u should be done withing 2-4 wks.    ED Triage Vitals [03/31/24 1929]   Temperature Pulse Respirations Blood Pressure SpO2   97.8 °F (36.6 °C) 75 16 152/72 99 %      Temp  Source Heart Rate Source Patient Position - Orthostatic VS BP Location FiO2 (%)   Tympanic Monitor Sitting Left arm --      Pain Score       7          Wt Readings from Last 1 Encounters:   04/01/24 84.6 kg (186 lb 8.2 oz)     Additional Vital Signs:   04/01/24 07:47:54 98.7 °F (37.1 °C) 90 18 105/61 76 94 % -- -- --   04/01/24 05:46:38 98.8 °F (37.1 °C) 86 -- 115/70 85 94 % -- -- --   04/01/24 04:21:19 98.5 °F (36.9 °C) 82 18 117/70 86 95 % -- -- --   04/01/24 0255 97.8 °F (36.6 °C) 79 18 118/71 87 93 % --       04/01/24 01:51:50 97.2 °F (36.2 °C) Abnormal  75 17 131/80 97 94 % None (Room air) -- --   04/01/24 0134 98.1 °F (36.7 °C) 84 18 112/65 82 96 % None (Room air) WDL --   04/01/24 0115 -- -- -- -- -- -- None (Room air) WDL      Pertinent Labs/Diagnostic Test Results:   CT abdomen pelvis w contrast   Final Result by Titi Coker DO (03/31 2217)   Addendum (preliminary) 1 of 1 by Titi Coker DO (03/31 2217)   ADDENDUM:   Successfully communicated the impression of appendicitis to Heath Gudino    on 3/31/2024 via [place] text 10:08 p.m.      I personally discussed this    study with Dr. Wolff on 3/31/2024 10:16 PM.                     Final   Acute appendicitis. No obvious perforation or abscess.      Attempting to contact referring physician.      Workstation performed: [id number]               Results from last 7 days   Lab Units 04/01/24  0547 03/31/24 2001   WBC Thousand/uL 15.43* 13.90*   HEMOGLOBIN g/dL 16.4 17.0   HEMATOCRIT % 45.8 47.7   PLATELETS Thousands/uL 272 278   NEUTROS ABS Thousands/µL 14.43* 11.04*         Results from last 7 days   Lab Units 04/01/24 0547 03/31/24 2001   SODIUM mmol/L 136 136   POTASSIUM mmol/L 4.1 3.9   CHLORIDE mmol/L 102 101   CO2 mmol/L 23 25   ANION GAP mmol/L 11 10   BUN mg/dL 7 11   CREATININE mg/dL 0.85 0.98   EGFR ml/min/1.73sq m 117 103   CALCIUM mg/dL 8.4 8.8     Results from last 7 days   Lab Units 03/31/24 2001   AST U/L 20   ALT U/L 40   ALK PHOS U/L  63   TOTAL PROTEIN g/dL 7.1   ALBUMIN g/dL 4.4   TOTAL BILIRUBIN mg/dL 0.79         Results from last 7 days   Lab Units 04/01/24  0547 03/31/24 2001   GLUCOSE RANDOM mg/dL 167* 109           Results from last 7 days   Lab Units 03/31/24 2001   LIPASE u/L 16                 Results from last 7 days   Lab Units 03/31/24 2028   CLARITY UA  Clear   COLOR UA  Colorless   SPEC GRAV UA  1.010   PH UA  7.0   GLUCOSE UA mg/dl Negative   KETONES UA mg/dl Negative   BLOOD UA  Negative   PROTEIN UA mg/dl Negative   NITRITE UA  Negative   BILIRUBIN UA  Negative   UROBILINOGEN UA (BE) mg/dl <2.0   LEUKOCYTES UA  Negative       ED Treatment:   Medication Administration from 03/31/2024 1925 to 03/31/2024 2250         Date/Time Order Dose Route Action     03/31/2024 2000 EDT multi-electrolyte (ISOLYTE-S PH 7.4) bolus 1,000 mL 1,000 mL Intravenous New Bag     03/31/2024 2112 EDT iohexol (OMNIPAQUE) 350 MG/ML injection (MULTI-DOSE) 100 mL 100 mL Intravenous Given          History reviewed. No pertinent past medical history.  Present on Admission:  **None**      Admitting Diagnosis: Acute appendicitis [K35.80]  Age/Sex: 28 y.o. male    Admission Orders:  Scheduled Medications:  acetaminophen, 975 mg, Oral, Q8H AMEENA  heparin (porcine), 5,000 Units, Subcutaneous, Q8H AMEENA   HYDROmorphone, 0.5 mg, Intravenous, Once  metroNIDAZOLE, 500 mg, Intravenous, Q8H    ceftriaxone (ROCEPHIN) 1 g/50 mL in dextrose IVPB  Dose: 1,000 mg  Freq: Once Route: IV  Start: 03/31/24 2230 End: 03/31/24 2313     Continuous IV Infusions:   multi-electrolyte (PLASMALYTE-A/ISOLYTE-S PH 7.4) IV solution  Rate: 125 mL/hr Dose: 125 mL/hr  Freq: Continuous Route: IV  Indications of Use: IV Hydration  Last Dose: Stopped (04/01/24 0841)  Start: 04/01/24 0215 End: 04/01/24 0822     PRN Meds:  naloxone, 0.04 mg, Intravenous, Q1MIN PRN  ondansetron, 4 mg, Intravenous, Q6H PRN  oxyCODONE, 5 mg, Oral, Q4H PRN   Or  oxyCODONE, 10 mg, Oral, Q4H PRN 4/1 x1    HYDROmorphone  (DILAUDID) injection 0.5 mg  Dose: 0.5 mg  Freq: Every 4 hours PRN Route: IV 4/1 x1  PRN Reason: other  PRN Comment: or if patient qualifies for treatment of moderate or severe pain but cannot take oral medications  Start: 04/01/24 0053 End: 04/01/24 0822         None    Network Utilization Review Department  ATTENTION: Please call with any questions or concerns to [phone] and carefully listen to the prompts so that you are directed to the right person. All voicemails are confidential.   For Discharge needs, contact Care Management DC Support Team at [phone] opt. 2  Send all requests for admission clinical reviews, approved or denied determinations and any other requests to dedicated fax number below belonging to the campus where the patient is receiving treatment. List of dedicated fax numbers for the Facilities:  FACILITY NAME UR FAX NUMBER   ADMISSION DENIALS (Administrative/Medical Necessity) [phone]   DISCHARGE SUPPORT TEAM (NETWORK) [phone]   PARENT CHILD HEALTH (Maternity/NICU/Pediatrics) [phone]   [facility] [phone]   [facility] [phone]   [facility] [phone]   [facility] [phone]   [facility] [phone]   [facility] [phone]   [facility] [phone]   [facility] [phone]   [facility] [phone]   [facility] [phone]   [facility] [phone]   [facility] [phone]

## 2024-04-01 NOTE — DISCHARGE SUMMARY
"  Discharge Summary - David Up 28 y.o. male MRN: [id number]    [facility] Encounter: [id number]    Admission Date:   Admission Orders (From admission, onward)       Ordered        03/31/24 2302  Inpatient Admission  Once                            Admitting Diagnosis: Acute appendicitis [K35.80]    HPI: Per Chelsie Copeland, \"David Up is a 28 y.o. male without known medical history, no past surgeries, no daily medications, no anticoagulants/antiplatelets.      Presents with RLQ abdominal pain since last evening. Nausea without vomiting. Anorexia. No fever. Reports chills. Last ate approximately seven hours ago.     Occupation involves occasional heavy lifting.\"    Procedures Performed: No orders of the defined types were placed in this encounter.      Summary of Hospital Course: Patient is a 28-year-old male comes in for evaluation of acute appendicitis.  Went to the OR and did well postoperatively.  Was monitored on the floor.  Tolerated p.o. intake.  Was ultimately discharged home with family support.  To follow-up outpatient in 2 weeks.  Was also recommended follow-up outpatient with his PCP in 2 weeks.  Incidental findings were discussed prior to discharge.  Patient expressed verbal understanding of his incidental findings.    Significant Findings, Care, Treatment and Services Provided:   CT abdomen pelvis w contrast    Addendum Date: 3/31/2024    ADDENDUM: Successfully communicated the impression of appendicitis to Heath Gudino on 3/31/2024 via [place] text 10:08 p.m.      I personally discussed this study with Dr. Wolff on 3/31/2024 10:16 PM.     Result Date: 3/31/2024  Impression: Acute appendicitis. No obvious perforation or abscess. Attempting to contact referring physician. Workstation performed: [id number]          Complications: no complications.    Discharge Diagnosis:   Patient Active Problem List   Diagnosis    Acute appendicitis         Medical Problems        Condition at " Discharge: good         Discharge instructions/Information to patient and family:   See after visit summary for information provided to patient and family.      Provisions for Follow-Up Care:  See after visit summary for information related to follow-up care and any pertinent home health orders.      PCP: No primary care provider on file.    Disposition: Home    Planned Readmission: No      Discharge Statement   I spent 23 minutes discharging the patient. This time was spent on the day of discharge. I had direct contact with the patient on the day of discharge. Additional documentation is required if more than 30 minutes were spent on discharge.     Discharge Medications:  See after visit summary for reconciled discharge medications provided to patient and family.

## 2024-04-01 NOTE — ED ATTENDING ATTESTATION
3/31/2024  I, Heath Gudino MD, saw and evaluated the patient. I have discussed the patient with the resident/non-physician practitioner and agree with the resident's/non-physician practitioner's findings, Plan of Care, and MDM as documented in the resident's/non-physician practitioner's note, except where noted. All available labs and Radiology studies were reviewed.  I was present for key portions of any procedure(s) performed by the resident/non-physician practitioner and I was immediately available to provide assistance.       At this point I agree with the current assessment done in the Emergency Department.  I have conducted an independent evaluation of this patient a history and physical is as follows:    ED Course     28-year-old male presents with 1 day history of right lower quadrant abdominal pain denies radiation denies nausea vomiting or diarrhea.    Vitals reviewed.  Patient point tender over right lower quadrant (McBurney's point) no guarding or rebound noted normal bowel sounds.  Normal genitourinary exam    Impression: Right lower quadrant pain  Differential diagnosis: Appendicitis, diverticulitis, mesenteric adenitis, renal colic ureteral colic    Plan to check labs CTAP reassess    Labs reviewed: CBCRemarkable for leukocytosis and shift.  BMP remarkable for elevated glucose.    CT abdomen pelvis discussed with radiology findings concerning for acute appendicitis without obvious perforation or abscess.    Patient was started on IV antibiotics for appendicitis Case discussed with acute care surgery service will admit to surgery for appendicitis  Patient updated regarding all findings and plan of care.    Critical Care Time  Procedures

## 2024-04-01 NOTE — H&P
Consultation - General Surgery  : Red Surgery Resident role on [employer]  David Up 28 y.o. male MRN: [id number]  Unit/Bed#: [facility] Encounter: [id number]    Assessment:  28 y.o. male with acute appendicitis    Plan:  - OR for laparoscopic appendectomy  - NPO/IVF  - Admit to general surgery service   - Pain and nausea control as needed  - DVT ppx    HPI:  David Up is a 28 y.o. male without known medical history, no past surgeries, no daily medications, no anticoagulants/antiplatelets.     Presents with RLQ abdominal pain since last evening. Nausea without vomiting. Anorexia. No fever. Reports chills. Last ate approximately seven hours ago.    Occupation involves occasional heavy lifting.     WBC 13.9  AVSS on RA    Physical Exam  Vitals reviewed.   Constitutional:       General: He is not in acute distress.     Appearance: He is not ill-appearing.   HENT:      Head: Normocephalic and atraumatic.      Mouth/Throat:      Mouth: Mucous membranes are moist.   Eyes:      Extraocular Movements: Extraocular movements intact.   Cardiovascular:      Rate and Rhythm: Normal rate.   Pulmonary:      Effort: Pulmonary effort is normal. No respiratory distress.   Abdominal:      General: There is distension (Mild).      Palpations: Abdomen is soft.      Tenderness: There is abdominal tenderness (RLQ).   Musculoskeletal:      Right lower leg: No edema.      Left lower leg: No edema.   Skin:     General: Skin is warm and dry.   Neurological:      General: No focal deficit present.      Mental Status: He is alert and oriented to person, place, and time.        Review of Systems negative unless stated above    Objective       No intake or output data in the 24 hours ending 03/31/24 2256    First Vitals:   Blood Pressure: 152/72 (03/31/24 1929)  Pulse: 75 (03/31/24 1929)  Temperature: 97.8 °F (36.6 °C) (03/31/24 1929)  Temp Source: Tympanic (03/31/24 1929)  Respirations: 16 (03/31/24 1929)  SpO2: 99 %  (03/31/24 1929)    Current Vitals:   Blood Pressure: 152/72 (03/31/24 1929)  Pulse: 75 (03/31/24 1929)  Temperature: 97.8 °F (36.6 °C) (03/31/24 1929)  Temp Source: Tympanic (03/31/24 1929)  Respirations: 16 (03/31/24 1929)  SpO2: 99 % (03/31/24 1929)    Invasive Devices       Peripheral Intravenous Line  Duration             Peripheral IV 03/31/24 Right Antecubital <1 day                    Imaging: I have personally reviewed pertinent reports.      CT abdomen pelvis w contrast    Addendum Date: 3/31/2024    ADDENDUM: Successfully communicated the impression of appendicitis to Heath Gudino on 3/31/2024 via [place] text 10:08 p.m.      I personally discussed this study with Dr. Wolff on 3/31/2024 10:16 PM.     Result Date: 3/31/2024  Impression: Acute appendicitis. No obvious perforation or abscess. Attempting to contact referring physician. Workstation performed: [id number]       EKG, Pathology, and Other Studies: I have personally reviewed pertinent reports.      Historical Information   History reviewed. No pertinent past medical history.  History reviewed. No pertinent surgical history.  Social History   Social History     Substance and Sexual Activity   Alcohol Use None     Social History     Substance and Sexual Activity   Drug Use Not on file     Social History     Tobacco Use   Smoking Status Never   Smokeless Tobacco Never     History reviewed. No pertinent family history.    Meds/Allergies   all current active meds have been reviewed, current meds:   Current Facility-Administered Medications   Medication Dose Route Frequency    [Transfer Hold] ceftriaxone (ROCEPHIN) 1 g/50 mL in dextrose IVPB  1,000 mg Intravenous Once    [Transfer Hold] HYDROmorphone (DILAUDID) injection 0.5 mg  0.5 mg Intravenous Once    [Transfer Hold] metroNIDAZOLE (FLAGYL) IVPB (premix) 500 mg 100 mL  500 mg Intravenous Q8H    and PTA meds:   None     No Known Allergies    Lab Results: I have personally reviewed pertinent lab results.   , CBC:   Lab Results   Component Value Date    WBC 13.90 (H) 03/31/2024    HGB 17.0 03/31/2024    HCT 47.7 03/31/2024    MCV 88 03/31/2024     03/31/2024    RBC 5.44 03/31/2024    MCH 31.3 03/31/2024    MCHC 35.6 03/31/2024    RDW 11.5 (L) 03/31/2024    MPV 9.1 03/31/2024    NRBC 0 03/31/2024   , CMP:   Lab Results   Component Value Date    SODIUM 136 03/31/2024    K 3.9 03/31/2024     03/31/2024    CO2 25 03/31/2024    BUN 11 03/31/2024    CREATININE 0.98 03/31/2024    CALCIUM 8.8 03/31/2024    AST 20 03/31/2024    ALT 40 03/31/2024    ALKPHOS 63 03/31/2024    EGFR 103 03/31/2024       Counseling / Coordination of Care  Total floor / unit time spent today 25 minutes.  Greater than 50% of total time was spent with the patient and / or family counseling and / or coordination of care.    ---  Chelsie Copeland MD  General Surgery PGY-I

## 2024-04-01 NOTE — ED CARE HANDOFF
Emergency Department Sign Out Note        Sign out and transfer of care from Dr. London. See Separate Emergency Department note.     The patient, David Up, was evaluated by the previous provider for RLQ abdominal pain.    Workup Completed:  Labs, CT pending at time of signout    ED Course / Workup Pending (followup):    CT findings reviewed by me, with radiologist, and with the patient.  Acute appendicitis noted.  Call was placed to surgery resident, arrangements made for evaluation and admission.  Patient was given Dilaudid for pain, as well as Rocephin and Flagyl for surgical prophylaxis.  Patient agreeable with the plan for admission.                                ED Course as of 03/31/24 2222   [place] Mar 31, 2024   2051 SO: RLQ abdominal pain 1 day hx, elevated WBC. Pending CT. Possible stone vs appy     Procedures  Medical Decision Making  Amount and/or Complexity of Data Reviewed  Labs: ordered.  Radiology: ordered.    Risk  Prescription drug management.  Decision regarding hospitalization.            Disposition  Final diagnoses:   Acute appendicitis     Time reflects when diagnosis was documented in both MDM as applicable and the Disposition within this note       Time User Action Codes Description Comment    3/31/2024 10:18 PM Williams Wolff Add [K35.80] Acute appendicitis           ED Disposition       ED Disposition   Admit    Condition   Stable    Date/Time   Sun Mar 31, 2024 10:18 PM    Comment   Admit to surgery               Follow-up Information    None       Patient's Medications    No medications on file     No discharge procedures on file.       ED Provider  Electronically Signed by     Williams Wolff DO  03/31/24 2222

## 2024-04-01 NOTE — PLAN OF CARE
Problem: PAIN - ADULT  Goal: Verbalizes/displays adequate comfort level or baseline comfort level  Description: Interventions:  - Encourage patient to monitor pain and request assistance  - Assess pain using appropriate pain scale  - Administer analgesics based on type and severity of pain and evaluate response  - Implement non-pharmacological measures as appropriate and evaluate response  - Consider cultural and social influences on pain and pain management  - Notify physician/advanced practitioner if interventions unsuccessful or patient reports new pain  Outcome: Progressing     Problem: INFECTION - ADULT  Goal: Absence or prevention of progression during hospitalization  Description: INTERVENTIONS:  - Assess and monitor for signs and symptoms of infection  - Monitor lab/diagnostic results  - Monitor all insertion sites, i.e. indwelling lines, tubes, and drains  - Monitor endotracheal if appropriate and nasal secretions for changes in amount and color  - Artie appropriate cooling/warming therapies per order  - Administer medications as ordered  - Instruct and encourage patient and family to use good hand hygiene technique  - Identify and instruct in appropriate isolation precautions for identified infection/condition  Outcome: Progressing  Goal: Absence of fever/infection during neutropenic period  Description: INTERVENTIONS:  - Monitor WBC    Outcome: Progressing     Problem: DISCHARGE PLANNING  Goal: Discharge to home or other facility with appropriate resources  Description: INTERVENTIONS:  - Identify barriers to discharge w/patient and caregiver  - Arrange for needed discharge resources and transportation as appropriate  - Identify discharge learning needs (meds, wound care, etc.)  - Arrange for interpretive services to assist at discharge as needed  - Refer to Case Management Department for coordinating discharge planning if the patient needs post-hospital services based on physician/advanced  practitioner order or complex needs related to functional status, cognitive ability, or social support system  Outcome: Progressing     Problem: Knowledge Deficit  Goal: Patient/family/caregiver demonstrates understanding of disease process, treatment plan, medications, and discharge instructions  Description: Complete learning assessment and assess knowledge base.  Interventions:  - Provide teaching at level of understanding  - Provide teaching via preferred learning methods  Outcome: Progressing

## 2024-04-01 NOTE — ANESTHESIA PREPROCEDURE EVALUATION
Procedure:  APPENDECTOMY LAPAROSCOPIC (Abdomen)    Relevant Problems   No relevant active problems        Physical Exam    Airway    Mallampati score: II  TM Distance: >3 FB  Neck ROM: full     Dental   No notable dental hx     Cardiovascular  Cardiovascular exam normal    Pulmonary  Pulmonary exam normal     Other Findings        Anesthesia Plan  ASA Score- 1 Emergent    Anesthesia Type- general with ASA Monitors.         Additional Monitors:     Airway Plan: ETT.           Plan Factors-Exercise tolerance (METS): >4 METS.      Imaging results reviewed. Existing labs reviewed. Patient summary reviewed.    Patient is not a current smoker.              Induction- intravenous.    Postoperative Plan-     Informed Consent- Anesthetic plan and risks discussed with patient.  I personally reviewed this patient with the CRNA. Discussed and agreed on the Anesthesia Plan with the CRNA..

## 2024-04-02 NOTE — UTILIZATION REVIEW
NOTIFICATION OF ADMISSION DISCHARGE   This is a Notification of Discharge from [facility]. Please be advised that this patient has been discharge from our facility. Below you will find the admission and discharge date and time including the patient’s disposition.   UTILIZATION REVIEW CONTACT:  Ezio Barrett  Utilization   Network Utilization Review Department  Phone: [phone] x carefully listen to the prompts. All voicemails are confidential.  Email: [email or web address]     ADMISSION INFORMATION  PRESENTATION DATE: 3/31/2024  7:45 PM  OBERVATION ADMISSION DATE:   INPATIENT ADMISSION DATE: 3/31/24 11:03 PM   DISCHARGE DATE: 4/1/2024 11:22 AM   DISPOSITION:Home/Self Care    Network Utilization Review Department  ATTENTION: Please call with any questions or concerns to [phone] and carefully listen to the prompts so that you are directed to the right person. All voicemails are confidential.   For Discharge needs, contact Care Management DC Support Team at [phone] opt. 2  Send all requests for admission clinical reviews, approved or denied determinations and any other requests to dedicated fax number below belonging to the campus where the patient is receiving treatment. List of dedicated fax numbers for the Facilities:  FACILITY NAME UR FAX NUMBER   ADMISSION DENIALS (Administrative/Medical Necessity) [phone]   DISCHARGE SUPPORT TEAM ([facility]) [phone]   PARENT CHILD HEALTH (Maternity/NICU/Pediatrics) [phone]   [facility] [phone]   [facility] [phone]   [facility] [phone]   [facility] [phone]   [facility] [phone]   [facility] [phone]   [facility] [phone]   [facility] [phone]   [facility]  [facility] [phone]   [facility] [phone]   [facility] [phone]   [facility] [phone]

## 2024-04-03 PROCEDURE — 88304 TISSUE EXAM BY PATHOLOGIST: CPT | Performed by: STUDENT IN AN ORGANIZED HEALTH CARE EDUCATION/TRAINING PROGRAM

## 2024-04-18 ENCOUNTER — OFFICE VISIT (OUTPATIENT)
Dept: SURGERY | Facility: CLINIC | Age: 29
End: 2024-04-18

## 2024-04-18 VITALS — HEIGHT: 66 IN | TEMPERATURE: 97.2 F | BODY MASS INDEX: 27.19 KG/M2 | WEIGHT: 169.2 LBS

## 2024-04-18 DIAGNOSIS — K35.80 ACUTE APPENDICITIS, UNSPECIFIED ACUTE APPENDICITIS TYPE: Primary | ICD-10-CM

## 2024-04-18 PROCEDURE — 99024 POSTOP FOLLOW-UP VISIT: CPT | Performed by: SURGERY

## 2024-04-18 NOTE — PROGRESS NOTES
Office Visit - General Surgery  David Up MRN: [id number]  Encounter: [id number]  Assessment/Plan    Problem List Items Addressed This Visit          Digestive    Acute appendicitis - Primary     S/p lap appy 3/31/24  - path reviewed with patient  - f/u prn            Subjective    David Up is a 28 y.o. male who presents for follow up s/p lap appy 3/31/24. Doing well, pain controlled. No f/c/n/v. Tolerating diet. (+) BM/flatus.      Pt  has no past medical history on file.    Pt  has a past surgical history that includes APPENDECTOMY LAPAROSCOPIC (N/A, 3/31/2024).    family history is not on file.    David Up reports that he has never smoked. He has never used smokeless tobacco.      Current Outpatient Medications on File Prior to Visit   Medication Sig Dispense Refill    acetaminophen (TYLENOL) 325 mg tablet Take 2 tablets (650 mg total) by mouth every 6 (six) hours as needed for mild pain       No current facility-administered medications on file prior to visit.     No Known Allergies    Review of Systems   Constitutional: Negative.    Respiratory: Negative.     Cardiovascular: Negative.    Gastrointestinal: Negative.    Musculoskeletal: Negative.    Skin: Negative.    All other systems reviewed and are negative.      Objective    Vitals:    04/18/24 1624   Temp: (!) 97.2 °F (36.2 °C)       Physical Exam  Constitutional:       Appearance: Normal appearance. He is normal weight.   Cardiovascular:      Rate and Rhythm: Normal rate.      Pulses: Normal pulses.   Pulmonary:      Effort: Pulmonary effort is normal.   Abdominal:      General: There is no distension.      Palpations: Abdomen is soft.      Tenderness: There is no abdominal tenderness. There is no guarding or rebound.      Comments: Incisions well healed, no erythema/drainage/fluctuance   Musculoskeletal:         General: Normal range of motion.   Skin:     General: Skin is warm and dry.

## 2024-04-26 ENCOUNTER — TELEPHONE (OUTPATIENT)
Age: 29
End: 2024-04-26

## 2024-04-26 NOTE — TELEPHONE ENCOUNTER
Pt calling in s/p appendectomy on 3/31 with left abdominal pain below his bellybutton on left side x 2-3 days that he is concerned about. Pt describes the area feeling swollen and tender to the touch and 3/10 pain, at times 5/10 pain but does not need to take any medication for this pain. Pt states his incisions are healed and no pain at incision sites. Denies fever. Pt would like to come in for a provider to examine. Warm transfer to clerical staff, Mo, in office for assistance with scheduling due to previous visits were with trauma team.

## 2024-04-26 NOTE — TELEPHONE ENCOUNTER
Pt called because he had lap appy on 03/31/24 but is starting to have pain, but not in inscision area. Warm transfer to nurse triage(Lillie)

## 2024-04-28 ENCOUNTER — APPOINTMENT (EMERGENCY)
Dept: RADIOLOGY | Facility: HOSPITAL | Age: 29
End: 2024-04-28
Payer: COMMERCIAL

## 2024-04-28 ENCOUNTER — HOSPITAL ENCOUNTER (EMERGENCY)
Facility: HOSPITAL | Age: 29
Discharge: HOME | End: 2024-04-28
Attending: EMERGENCY MEDICINE
Payer: COMMERCIAL

## 2024-04-28 VITALS
RESPIRATION RATE: 17 BRPM | TEMPERATURE: 97 F | HEART RATE: 100 BPM | OXYGEN SATURATION: 99 % | DIASTOLIC BLOOD PRESSURE: 78 MMHG | SYSTOLIC BLOOD PRESSURE: 147 MMHG

## 2024-04-28 DIAGNOSIS — R10.9 ABDOMINAL PAIN: Primary | ICD-10-CM

## 2024-04-28 LAB
ALBUMIN SERPL BCP-MCNC: 4.6 G/DL (ref 3.5–5)
ALP SERPL-CCNC: 69 U/L (ref 34–104)
ALT SERPL W P-5'-P-CCNC: 39 U/L (ref 7–52)
ANION GAP SERPL CALCULATED.3IONS-SCNC: 9 MMOL/L (ref 4–13)
AST SERPL W P-5'-P-CCNC: 23 U/L (ref 13–39)
ATRIAL RATE: 91 BPM
BACTERIA UR QL AUTO: NORMAL /HPF
BASOPHILS # BLD AUTO: 0.03 THOUSANDS/ÂΜL (ref 0–0.1)
BASOPHILS NFR BLD AUTO: 0 % (ref 0–1)
BILIRUB SERPL-MCNC: 0.64 MG/DL (ref 0.2–1)
BILIRUB UR QL STRIP: NEGATIVE
BUN SERPL-MCNC: 15 MG/DL (ref 5–25)
CALCIUM SERPL-MCNC: 9.1 MG/DL (ref 8.4–10.2)
CHLORIDE SERPL-SCNC: 102 MMOL/L (ref 96–108)
CLARITY UR: CLEAR
CO2 SERPL-SCNC: 29 MMOL/L (ref 21–32)
COLOR UR: COLORLESS
CREAT SERPL-MCNC: 1.08 MG/DL (ref 0.6–1.3)
EOSINOPHIL # BLD AUTO: 0.38 THOUSAND/ÂΜL (ref 0–0.61)
EOSINOPHIL NFR BLD AUTO: 5 % (ref 0–6)
ERYTHROCYTE [DISTWIDTH] IN BLOOD BY AUTOMATED COUNT: 11.6 % (ref 11.6–15.1)
GFR SERPL CREATININE-BSD FRML MDRD: 92 ML/MIN/1.73SQ M
GLUCOSE SERPL-MCNC: 99 MG/DL (ref 65–140)
GLUCOSE UR STRIP-MCNC: NEGATIVE MG/DL
HCT VFR BLD AUTO: 48.3 % (ref 36.5–49.3)
HGB BLD-MCNC: 17.2 G/DL (ref 12–17)
HGB UR QL STRIP.AUTO: NEGATIVE
IMM GRANULOCYTES # BLD AUTO: 0.01 THOUSAND/UL (ref 0–0.2)
IMM GRANULOCYTES NFR BLD AUTO: 0 % (ref 0–2)
KETONES UR STRIP-MCNC: NEGATIVE MG/DL
LEUKOCYTE ESTERASE UR QL STRIP: NEGATIVE
LIPASE SERPL-CCNC: 31 U/L (ref 11–82)
LYMPHOCYTES # BLD AUTO: 2.45 THOUSANDS/ÂΜL (ref 0.6–4.47)
LYMPHOCYTES NFR BLD AUTO: 34 % (ref 14–44)
MCH RBC QN AUTO: 31.4 PG (ref 26.8–34.3)
MCHC RBC AUTO-ENTMCNC: 35.6 G/DL (ref 31.4–37.4)
MCV RBC AUTO: 88 FL (ref 82–98)
MONOCYTES # BLD AUTO: 0.7 THOUSAND/ÂΜL (ref 0.17–1.22)
MONOCYTES NFR BLD AUTO: 10 % (ref 4–12)
NEUTROPHILS # BLD AUTO: 3.75 THOUSANDS/ÂΜL (ref 1.85–7.62)
NEUTS SEG NFR BLD AUTO: 51 % (ref 43–75)
NITRITE UR QL STRIP: NEGATIVE
NON-SQ EPI CELLS URNS QL MICRO: NORMAL /HPF
NRBC BLD AUTO-RTO: 0 /100 WBCS
P AXIS: 60 DEGREES
PH UR STRIP.AUTO: 7 [PH]
PLATELET # BLD AUTO: 246 THOUSANDS/UL (ref 149–390)
PMV BLD AUTO: 9.8 FL (ref 8.9–12.7)
POTASSIUM SERPL-SCNC: 3.9 MMOL/L (ref 3.5–5.3)
PR INTERVAL: 158 MS
PROT SERPL-MCNC: 6.9 G/DL (ref 6.4–8.4)
PROT UR STRIP-MCNC: ABNORMAL MG/DL
QRS AXIS: 82 DEGREES
QRSD INTERVAL: 88 MS
QT INTERVAL: 328 MS
QTC INTERVAL: 403 MS
RBC # BLD AUTO: 5.48 MILLION/UL (ref 3.88–5.62)
RBC #/AREA URNS AUTO: NORMAL /HPF
SODIUM SERPL-SCNC: 140 MMOL/L (ref 135–147)
SP GR UR STRIP.AUTO: >=1.05 (ref 1–1.03)
T WAVE AXIS: 58 DEGREES
UROBILINOGEN UR STRIP-ACNC: <2 MG/DL
VENTRICULAR RATE: 91 BPM
WBC # BLD AUTO: 7.32 THOUSAND/UL (ref 4.31–10.16)
WBC #/AREA URNS AUTO: NORMAL /HPF

## 2024-04-28 PROCEDURE — 36415 COLL VENOUS BLD VENIPUNCTURE: CPT

## 2024-04-28 PROCEDURE — 81001 URINALYSIS AUTO W/SCOPE: CPT

## 2024-04-28 PROCEDURE — 83690 ASSAY OF LIPASE: CPT

## 2024-04-28 PROCEDURE — 80053 COMPREHEN METABOLIC PANEL: CPT

## 2024-04-28 PROCEDURE — 96366 THER/PROPH/DIAG IV INF ADDON: CPT

## 2024-04-28 PROCEDURE — 96365 THER/PROPH/DIAG IV INF INIT: CPT

## 2024-04-28 PROCEDURE — 99284 EMERGENCY DEPT VISIT MOD MDM: CPT

## 2024-04-28 PROCEDURE — 99024 POSTOP FOLLOW-UP VISIT: CPT | Performed by: SURGERY

## 2024-04-28 PROCEDURE — 93010 ELECTROCARDIOGRAM REPORT: CPT | Performed by: INTERNAL MEDICINE

## 2024-04-28 PROCEDURE — 85025 COMPLETE CBC W/AUTO DIFF WBC: CPT

## 2024-04-28 PROCEDURE — 74177 CT ABD & PELVIS W/CONTRAST: CPT

## 2024-04-28 PROCEDURE — 99285 EMERGENCY DEPT VISIT HI MDM: CPT | Performed by: EMERGENCY MEDICINE

## 2024-04-28 PROCEDURE — 93005 ELECTROCARDIOGRAM TRACING: CPT

## 2024-04-28 PROCEDURE — 96375 TX/PRO/DX INJ NEW DRUG ADDON: CPT

## 2024-04-28 RX ORDER — SODIUM CHLORIDE, SODIUM GLUCONATE, SODIUM ACETATE, POTASSIUM CHLORIDE, MAGNESIUM CHLORIDE, SODIUM PHOSPHATE, DIBASIC, AND POTASSIUM PHOSPHATE .53; .5; .37; .037; .03; .012; .00082 G/100ML; G/100ML; G/100ML; G/100ML; G/100ML; G/100ML; G/100ML
1000 INJECTION, SOLUTION INTRAVENOUS ONCE
Status: COMPLETED | OUTPATIENT
Start: 2024-04-28 | End: 2024-04-28

## 2024-04-28 RX ORDER — GABAPENTIN 100 MG/1
100 CAPSULE ORAL 3 TIMES DAILY
Qty: 21 CAPSULE | Refills: 0 | Status: SHIPPED | OUTPATIENT
Start: 2024-04-28 | End: 2024-05-05

## 2024-04-28 RX ORDER — KETOROLAC TROMETHAMINE 30 MG/ML
15 INJECTION, SOLUTION INTRAMUSCULAR; INTRAVENOUS ONCE
Status: COMPLETED | OUTPATIENT
Start: 2024-04-28 | End: 2024-04-28

## 2024-04-28 RX ADMIN — IOHEXOL 100 ML: 350 INJECTION, SOLUTION INTRAVENOUS at 08:02

## 2024-04-28 RX ADMIN — SODIUM CHLORIDE, SODIUM GLUCONATE, SODIUM ACETATE, POTASSIUM CHLORIDE, MAGNESIUM CHLORIDE, SODIUM PHOSPHATE, DIBASIC, AND POTASSIUM PHOSPHATE 1000 ML: .53; .5; .37; .037; .03; .012; .00082 INJECTION, SOLUTION INTRAVENOUS at 07:50

## 2024-04-28 RX ADMIN — KETOROLAC TROMETHAMINE 15 MG: 30 INJECTION, SOLUTION INTRAMUSCULAR; INTRAVENOUS at 07:50

## 2024-04-28 NOTE — CONSULTS
Consultation Note- General Surgery  : [facility]  David Up 28 y.o. male MRN: [id number]  Unit/Bed#: ED 19 Encounter: [id number]        Assessment:  28 y.o. male with abdominal pain. S/p laparoscopic appendectomy on 3/31/24. Afebrile, HDS.    CT AP: expected post-operative changes  WBC 7    Plan:  - Safe for discharge home with outpatient follow up. Return precautions provided  - Tylenol, gabapentin, miralax    HPI:  David Up is a 28 y.o. male with a history of laparoscopic appendectomy on 3/31/24 who presents to do worsening left periumbilical pain that he describes as burning and sharp. He says the pain migrates. Not associated with eating. Having regular bowel function. No emesis. Tolerating a diet. On exam his abdomen is soft, non-tender, non-distended. Incisions healing appropriately. CT AP obtained which does not identify a source for the pain, no abscess, fluid collection, or hernia identified. Labs unremarkable.    Physical Exam:  General: No acute distress, alert and oriented  CV: Well perfused, regular rate and rhythm  Lungs: Normal work of breathing, no increased respiratory effort  Abdomen: Soft, non-tender, non-distended. Incision(s) clean, dry and intact.  Extremities: No edema, clubbing or cyanosis  Skin: Warm, dry        Review of Systems   Review of systems negative except as per HPI    Objective       No intake or output data in the 24 hours ending 04/28/24 1056    First Vitals:   Blood Pressure: 159/85 (04/28/24 0606)  Pulse: 98 (04/28/24 0606)  Temperature: (!) 97 °F (36.1 °C) (04/28/24 0606)  Temp Source: Temporal (04/28/24 0606)  Respirations: 18 (04/28/24 0606)  SpO2: 98 % (04/28/24 0606)    Current Vitals:   Blood Pressure: 147/78 (04/28/24 0615)  Pulse: 100 (04/28/24 0615)  Temperature: (!) 97 °F (36.1 °C) (04/28/24 0606)  Temp Source: Temporal (04/28/24 0606)  Respirations: 17 (04/28/24 0615)  SpO2: 99 % (04/28/24 0615)    Invasive  Devices       Peripheral Intravenous Line  Duration             Peripheral IV 04/28/24 Right Antecubital <1 day                    Imaging: I have personally reviewed pertinent reports.      CT abdomen pelvis with contrast    Result Date: 4/28/2024  Impression: 1. Anticipated postoperative changes following recent appendectomy 2. No acute intra-abdominal or intrapelvic abnormality Workstation performed: [id number]       EKG, Pathology, and Other Studies: I have personally reviewed pertinent reports.        Historical Information   History reviewed. No pertinent past medical history.  Past Surgical History:   Procedure Laterality Date    APPENDECTOMY LAPAROSCOPIC N/A 3/31/2024    Procedure: APPENDECTOMY LAPAROSCOPIC;  Surgeon: Lauryn Ullrich, DO;  Location: [facility];  Service: General     Social History   Social History     Substance and Sexual Activity   Alcohol Use None     Social History     Substance and Sexual Activity   Drug Use Not on file     Social History     Tobacco Use   Smoking Status Never   Smokeless Tobacco Current    Types: Chew     History reviewed. No pertinent family history.    Meds/Allergies   all current active meds have been reviewed, current meds:   No current facility-administered medications for this encounter.    and PTA meds:   Prior to Admission Medications   Prescriptions Last Dose Informant Patient Reported? Taking?   acetaminophen (TYLENOL) 325 mg tablet   No No   Sig: Take 2 tablets (650 mg total) by mouth every 6 (six) hours as needed for mild pain      Facility-Administered Medications: None     No Known Allergies    Lab Results: I have personally reviewed pertinent lab results.  , CBC:   Lab Results   Component Value Date    WBC 7.32 04/28/2024    HGB 17.2 (H) 04/28/2024    HCT 48.3 04/28/2024    MCV 88 04/28/2024     04/28/2024    RBC 5.48 04/28/2024    MCH 31.4 04/28/2024    MCHC 35.6 04/28/2024    RDW 11.6 04/28/2024    MPV 9.8 04/28/2024    NRBC 0 04/28/2024   , CMP:    Lab Results   Component Value Date    SODIUM 140 04/28/2024    K 3.9 04/28/2024     04/28/2024    CO2 29 04/28/2024    BUN 15 04/28/2024    CREATININE 1.08 04/28/2024    CALCIUM 9.1 04/28/2024    AST 23 04/28/2024    ALT 39 04/28/2024    ALKPHOS 69 04/28/2024    EGFR 92 04/28/2024       Counseling / Coordination of Care  Total floor / unit time spent today 25 minutes.  Greater than 50% of total time was spent with the patient and / or family counseling and / or coordination of care.    Consults    Tone Baptiste MD  General Surgery   04/28/24

## 2024-04-28 NOTE — ED ATTENDING ATTESTATION
4/28/2024  I, Bennett Haro MD, saw and evaluated the patient. I have discussed the patient with the resident/non-physician practitioner and agree with the resident's/non-physician practitioner's findings, Plan of Care, and MDM as documented in the resident's/non-physician practitioner's note, except where noted. All available labs and Radiology studies were reviewed.  I was present for key portions of any procedure(s) performed by the resident/non-physician practitioner and I was immediately available to provide assistance.       At this point I agree with the current assessment done in the Emergency Department.  I have conducted an independent evaluation of this patient a history and physical is as follows:  Patient presents with complaints of abdominal pain patient is status post appendectomy approximately 3-4 weeks ago  Has been having loose stool no vomiting no fever   Lungs clear heart regular abdomen soft positive bowel sounds mild diffuse tenderness without rebound or guarding nondistended  Impression abdominal pain  Labs unremarkable  ED Course         Critical Care Time  Procedures

## 2024-04-28 NOTE — Clinical Note
David Up was seen and treated in our emergency department on 4/28/2024.                Diagnosis: Medical Problem requiring ER evaluation/treatment    David  is off the rest of the shift today, may return to work on return date.    He may return on this date: 04/30/2024         If you have any questions or concerns, please don't hesitate to call.      Riddhi Lozoya, DO    ______________________________           _______________          _______________  Hospital Representative                              Date                                Time

## 2024-04-28 NOTE — DISCHARGE INSTRUCTIONS
You have been evaluated in the Emergency Department today for abdominal pain. Your evaluation was not suggestive of any emergent condition requiring medical intervention at this time; however, some abdominal problems make take more time to appear. Therefore, it is important for you to watch for any new symptoms or worsening of your current condition.     GENERAL SURGERY RECOMMENDS: Tylenol 650mg every 6 hours for pay, gabapentin (you have been given a prescription), and miralax for the next three days or until you have a bowel movement. Keep your outpatient follow up appointment tomorrow.    Please follow up with your primary care physician or gastroenterologist as soon as possible. If you do not have a primary doctor, you can call your insurance company to find one.  If you do not have insurance, you can go to the finance/registration department for more assistance.    Return to the Emergency Department if you experience worsening of current symptoms, severe abdominal pain, persistent fevers greater than 100.4F, recurrent vomiting, blood in vomit, blood in stool, dark tarry stool, chest pain, difficulty breathing, or any other concerning symptoms.

## 2024-04-29 ENCOUNTER — TELEPHONE (OUTPATIENT)
Age: 29
End: 2024-04-29

## 2024-04-29 ENCOUNTER — OFFICE VISIT (OUTPATIENT)
Dept: SURGERY | Facility: CLINIC | Age: 29
End: 2024-04-29

## 2024-04-29 VITALS — WEIGHT: 172.4 LBS | HEIGHT: 66 IN | TEMPERATURE: 97.8 F | BODY MASS INDEX: 27.71 KG/M2

## 2024-04-29 DIAGNOSIS — Z90.49 S/P LAPAROSCOPIC APPENDECTOMY: ICD-10-CM

## 2024-04-29 DIAGNOSIS — G89.18 POST-OP PAIN: Primary | ICD-10-CM

## 2024-04-29 PROCEDURE — 99024 POSTOP FOLLOW-UP VISIT: CPT | Performed by: SURGERY

## 2024-04-29 RX ORDER — METHOCARBAMOL 500 MG/1
500 TABLET, FILM COATED ORAL 4 TIMES DAILY
Qty: 84 TABLET | Refills: 0 | Status: SHIPPED | OUTPATIENT
Start: 2024-04-29

## 2024-04-29 RX ORDER — LIDOCAINE 50 MG/G
1 PATCH TOPICAL DAILY
Qty: 21 PATCH | Refills: 0 | Status: SHIPPED | OUTPATIENT
Start: 2024-04-29

## 2024-04-29 NOTE — ASSESSMENT & PLAN NOTE
28-year-old male with recent history of acute appendicitis status post laparoscopic appendectomy on 3/31/2024, seen on 4/28 in the emergency department for ongoing postoperative pain.  CT scan negative for any acute intra-abdominal findings    Plan:  Patient with ongoing postoperative pain but otherwise feels well, tolerating diet having bowel function and wounds healing well  Postoperative pain sounds neuropathic in nature, already has prescription for Tylenol and gabapentin but has not yet taken it, recommend trial of these medications, will also add Robaxin and lidocaine patches  Follow-up in 2 weeks to discuss progress of symptoms, if symptoms resolve no need for further follow-up from surgical perspective

## 2024-04-29 NOTE — PROGRESS NOTES
Office Visit - General Surgery  David Up MRN: [id number]  Encounter: [id number]  Assessment/Plan    Problem List Items Addressed This Visit          Surgery/Wound/Pain    S/P laparoscopic appendectomy     28-year-old male with recent history of acute appendicitis status post laparoscopic appendectomy on 3/31/2024, seen on 4/28 in the emergency department for ongoing postoperative pain.  CT scan negative for any acute intra-abdominal findings    Plan:  Patient with ongoing postoperative pain but otherwise feels well, tolerating diet having bowel function and wounds healing well  Postoperative pain sounds neuropathic in nature, already has prescription for Tylenol and gabapentin but has not yet taken it, recommend trial of these medications, will also add Robaxin and lidocaine patches  Follow-up in 2 weeks to discuss progress of symptoms, if symptoms resolve no need for further follow-up from surgical perspective          Other Visit Diagnoses       Post-op pain    -  Primary    Relevant Medications    methocarbamol (ROBAXIN) 500 mg tablet    lidocaine (Lidoderm) 5 %            Subjective    David Up is a 28 y.o. male who presents with ongoing postoperative pain.  Patient reports he had a laparoscopic appendectomy on 3/31.  He initially recovered well and was seen in the office on 4/18 at which time he was tolerating a diet and having bowel function and pain was well-controlled so was recommended as needed follow-up.  Approximately 2 weeks after his surgery he did start having some ongoing left lower quadrant pain.  He says this is stabbing/shooting in nature.  It waxes and wanes throughout the day but no specific inciting or relieving factors.  He presented to the emergency department on 4/28 at which time CT imaging was obtained that showed no intra-abdominal acute abnormality.  He was provided a prescription for Tylenol and gabapentin however he has not yet filled the gabapentin prescription as this  was just last night.  Currently, he still states he is doing well with his diet, denies nausea or vomiting and having bowel function.  No issues with his wounds.      Pt  has no past medical history on file.    Pt  has a past surgical history that includes APPENDECTOMY LAPAROSCOPIC (N/A, 3/31/2024).    family history is not on file.    David Up reports that he has never smoked. His smokeless tobacco use includes chew.      Current Outpatient Medications on File Prior to Visit   Medication Sig Dispense Refill    acetaminophen (TYLENOL) 325 mg tablet Take 2 tablets (650 mg total) by mouth every 6 (six) hours as needed for mild pain      gabapentin (Neurontin) 100 mg capsule Take 1 capsule (100 mg total) by mouth 3 (three) times a day for 7 days 21 capsule 0     Current Facility-Administered Medications on File Prior to Visit   Medication Dose Route Frequency Provider Last Rate Last Admin    [COMPLETED] multi-electrolyte (ISOLYTE-S PH 7.4) bolus 1,000 mL  1,000 mL Intravenous Once Riddhi Lozoya DO   Stopped at 04/28/24 1148     No Known Allergies    Review of Systems   Constitutional: Negative.    HENT: Negative.     Eyes: Negative.    Respiratory: Negative.     Cardiovascular: Negative.    Gastrointestinal:  Positive for abdominal pain (LLQ, shooting/stabbing). Negative for nausea and vomiting.        Surgical incisions   Endocrine: Negative.    Genitourinary: Negative.    Musculoskeletal: Negative.    Skin: Negative.    Neurological: Negative.    Psychiatric/Behavioral: Negative.         Objective    Vitals:    04/29/24 0910   Temp: 97.8 °F (36.6 °C)       Physical Exam  Constitutional:       Appearance: Normal appearance.   HENT:      Head: Normocephalic and atraumatic.      Right Ear: External ear normal.      Left Ear: External ear normal.      Nose: Nose normal.      Mouth/Throat:      Mouth: Mucous membranes are moist.      Pharynx: Oropharynx is clear.   Eyes:      Extraocular Movements:  Extraocular movements intact.      Conjunctiva/sclera: Conjunctivae normal.      Pupils: Pupils are equal, round, and reactive to light.   Cardiovascular:      Rate and Rhythm: Normal rate and regular rhythm.      Pulses: Normal pulses.   Pulmonary:      Effort: Pulmonary effort is normal.   Abdominal:      General: Abdomen is flat.      Palpations: Abdomen is soft.      Tenderness: There is no abdominal tenderness.   Musculoskeletal:         General: Normal range of motion.      Cervical back: Normal range of motion.   Skin:     General: Skin is warm and dry.   Neurological:      General: No focal deficit present.      Mental Status: He is alert and oriented to person, place, and time.   Psychiatric:         Mood and Affect: Mood normal.         Behavior: Behavior normal.

## 2024-04-29 NOTE — TELEPHONE ENCOUNTER
PA for Lidocaine    Submitted via    [x][facility]-KEY  [id number] PA Case ID: [id number]  []Surescripts-Case ID #    []Faxed to plan   []Other website    []Phone call Case ID #      Office notes sent, clinical questions answered. Awaiting determination    Turnaround time for your insurance to make a decision on your Prior Authorization can take 7-21 business days.

## 2024-04-29 NOTE — PATIENT INSTRUCTIONS
Recommend attempted pain control with Tylenol, gabapentin, Robaxin, and lidocaine patches for 2 weeks.  Follow-up in 2 weeks to reassess symptoms.

## 2024-05-01 NOTE — ED PROVIDER NOTES
History  Chief Complaint   Patient presents with    Abdominal Pain     Patient had appendectomy last month. Patient started having abd pain Wednesday. Patient experiencing dizziness and diarrhea.      HPI  Patient is a 28 y.o. male with PMHx laparoscopic appendectomy on 3/31/24 who presents to the ED for evaluation of left sided abdominal pain and loose stools. Came to the ED today due to worsening abdominal pain overnight. Denies headache, changes in vision or hearing, chest pain, shortness of breath, vomiting, melena, hematochezia, watery diarrhea, fevers or chills, dysuria or hematuria, testicular pain, falls or trauma, or any other complaints or concerns at this time.    Prior to Admission Medications   Prescriptions Last Dose Informant Patient Reported? Taking?   acetaminophen (TYLENOL) 325 mg tablet   No No   Sig: Take 2 tablets (650 mg total) by mouth every 6 (six) hours as needed for mild pain      Facility-Administered Medications: None       History reviewed. No pertinent past medical history.    Past Surgical History:   Procedure Laterality Date    APPENDECTOMY LAPAROSCOPIC N/A 3/31/2024    Procedure: APPENDECTOMY LAPAROSCOPIC;  Surgeon: Lauryn Ullrich, DO;  Location: [facility];  Service: General       History reviewed. No pertinent family history.  I have reviewed and agree with the history as documented.    E-Cigarette/Vaping     E-Cigarette/Vaping Substances     Social History     Tobacco Use    Smoking status: Never    Smokeless tobacco: Current     Types: Chew        Review of Systems   Gastrointestinal:  Positive for abdominal pain and diarrhea.   All other systems reviewed and are negative.      Physical Exam  ED Triage Vitals [04/28/24 0606]   Temperature Pulse Respirations Blood Pressure SpO2   (!) 97 °F (36.1 °C) 98 18 159/85 98 %      Temp Source Heart Rate Source Patient Position - Orthostatic VS BP Location FiO2 (%)   Temporal Monitor Lying Left arm --      Pain Score       8              Orthostatic Vital Signs  Vitals:    04/28/24 0606 04/28/24 0615   BP: 159/85 147/78   Pulse: 98 100   Patient Position - Orthostatic VS: Lying        Physical Exam  Vitals and nursing note reviewed.   Constitutional:       General: He is not in acute distress.  HENT:      Head: Normocephalic and atraumatic.      Mouth/Throat:      Mouth: Mucous membranes are moist.   Eyes:      General: No scleral icterus.     Conjunctiva/sclera: Conjunctivae normal.   Cardiovascular:      Rate and Rhythm: Normal rate and regular rhythm.      Heart sounds: Normal heart sounds.   Pulmonary:      Effort: Pulmonary effort is normal. No respiratory distress.      Breath sounds: Normal breath sounds.   Abdominal:      Palpations: Abdomen is soft.      Tenderness: There is abdominal tenderness in the periumbilical area, left upper quadrant and left lower quadrant. There is no guarding or rebound.      Comments: Well-healing surgical sites, no hernias, no dehiscence   Musculoskeletal:         General: No deformity. Normal range of motion.      Cervical back: Normal range of motion and neck supple.   Skin:     General: Skin is warm.      Capillary Refill: Capillary refill takes less than 2 seconds.      Findings: No rash.   Neurological:      Mental Status: He is alert. Mental status is at baseline.   Psychiatric:         Mood and Affect: Mood normal.         Behavior: Behavior normal.         ED Medications  Medications   multi-electrolyte (ISOLYTE-S PH 7.4) bolus 1,000 mL (0 mL Intravenous Stopped 4/28/24 1148)   ketorolac (TORADOL) injection 15 mg (15 mg Intravenous Given 4/28/24 0750)   iohexol (OMNIPAQUE) 350 MG/ML injection (MULTI-DOSE) 100 mL (100 mL Intravenous Given 4/28/24 0802)       Diagnostic Studies  Results Reviewed       Procedure Component Value Units Date/Time    Urine Microscopic [[id number]]  (Normal) Collected: 04/28/24 0853    Lab Status: Final result Specimen: Urine, Clean Catch Updated: 04/28/24 0947     RBC,  UA None Seen /hpf      WBC, UA None Seen /hpf      Epithelial Cells Occasional /hpf      Bacteria, UA None Seen /hpf     UA w Reflex to Microscopic w Reflex to Culture [[id number]]  (Abnormal) Collected: 04/28/24 0853    Lab Status: Final result Specimen: Urine, Clean Catch Updated: 04/28/24 0916     Color, UA Colorless     Clarity, UA Clear     Specific Gravity, UA >=1.050     pH, UA 7.0     Leukocytes, UA Negative     Nitrite, UA Negative     Protein, UA Trace mg/dl      Glucose, UA Negative mg/dl      Ketones, UA Negative mg/dl      Urobilinogen, UA <2.0 mg/dl      Bilirubin, UA Negative     Occult Blood, UA Negative    Comprehensive metabolic panel [[id number]] Collected: 04/28/24 0642    Lab Status: Final result Specimen: Blood from Arm, Right Updated: 04/28/24 0717     Sodium 140 mmol/L      Potassium 3.9 mmol/L      Chloride 102 mmol/L      CO2 29 mmol/L      ANION GAP 9 mmol/L      BUN 15 mg/dL      Creatinine 1.08 mg/dL      Glucose 99 mg/dL      Calcium 9.1 mg/dL      AST 23 U/L      ALT 39 U/L      Alkaline Phosphatase 69 U/L      Total Protein 6.9 g/dL      Albumin 4.6 g/dL      Total Bilirubin 0.64 mg/dL      eGFR 92 ml/min/1.73sq m     Narrative:      National Kidney Disease Foundation guidelines for Chronic Kidney Disease (CKD):     Stage 1 with normal or high GFR (GFR > 90 mL/min/1.73 square meters)    Stage 2 Mild CKD (GFR = 60-89 mL/min/1.73 square meters)    Stage 3A Moderate CKD (GFR = 45-59 mL/min/1.73 square meters)    Stage 3B Moderate CKD (GFR = 30-44 mL/min/1.73 square meters)    Stage 4 Severe CKD (GFR = 15-29 mL/min/1.73 square meters)    Stage 5 End Stage CKD (GFR <15 mL/min/1.73 square meters)  Note: GFR calculation is accurate only with a steady state creatinine    Lipase [[id number]]  (Normal) Collected: 04/28/24 0642    Lab Status: Final result Specimen: Blood from Arm, Right Updated: 04/28/24 0717     Lipase 31 u/L     CBC and differential [[id number]]  (Abnormal) Collected: 04/28/24  0642    Lab Status: Final result Specimen: Blood from Arm, Right Updated: 04/28/24 0652     WBC 7.32 Thousand/uL      RBC 5.48 Million/uL      Hemoglobin 17.2 g/dL      Hematocrit 48.3 %      MCV 88 fL      MCH 31.4 pg      MCHC 35.6 g/dL      RDW 11.6 %      MPV 9.8 fL      Platelets 246 Thousands/uL      nRBC 0 /100 WBCs      Segmented % 51 %      Immature Grans % 0 %      Lymphocytes % 34 %      Monocytes % 10 %      Eosinophils Relative 5 %      Basophils Relative 0 %      Absolute Neutrophils 3.75 Thousands/µL      Absolute Immature Grans 0.01 Thousand/uL      Absolute Lymphocytes 2.45 Thousands/µL      Absolute Monocytes 0.70 Thousand/µL      Eosinophils Absolute 0.38 Thousand/µL      Basophils Absolute 0.03 Thousands/µL                    CT abdomen pelvis with contrast   Final Result by Raoul Liriano MD (04/28 0837)      1. Anticipated postoperative changes following recent appendectomy   2. No acute intra-abdominal or intrapelvic abnormality               Workstation performed: [id number]               Procedures  Procedures      ED Course  ED Course as of 05/01/24 1115   Sun Apr 28, 2024   0913 CT abdomen pelvis with contrast  1. Anticipated postoperative changes following recent appendectomy  2. No acute intra-abdominal or intrapelvic abnormality                             SBIRT 22yo+      Flowsheet Row Most Recent Value   Initial Alcohol Screen: US AUDIT-C     1. How often do you have a drink containing alcohol? 0 Filed at: 04/28/2024 0610   2. How many drinks containing alcohol do you have on a typical day you are drinking?  0 Filed at: 04/28/2024 0610   3a. Male UNDER 65: How often do you have five or more drinks on one occasion? 0 Filed at: 04/28/2024 0610   Audit-C Score 0 Filed at: 04/28/2024 0610   SHIRA: How many times in the past year have you...    Used an illegal drug or used a prescription medication for non-medical reasons? Never Filed at: 04/28/2024 0610                  Medical Decision  Making  ASSESSMENT: Patient is a 28 y.o. male who presents with abdominal pain, 4 weeks postop, loose stools, without other infectious symptoms.   DDX includes but not limited to: Enteritis, colitis, hernia, abscess, doubt acute perforation or obstruction.   PLAN: CBC, CMP, lipase, CT abdomen pelvis, UA. Treated with Toradol. See ED course for additional details.    Patient reevaluated, reports improvement in pain.  Denies any new or worsening complaints or concerns.  Patient has had no episodes of vomiting or diarrhea while being observed in the ED.  Vital signs have remained stable throughout ED course.  Labs and diagnostics reviewed, general surgery consulted.  Case discussed, patient is cleared for discharge with outpatient follow-up.  Already has follow-up appointment scheduled and advised to keep this appointment. Discussed results and plan with patient. Advised on need for outpatient follow up, given information and referral. Given return precautions verbally and in discharge instructions, confirmed with teach back method. All questions answered. Patient expressed verbal understanding and is agreeable with plan for discharge with outpatient follow up.    Problems Addressed:  Abdominal pain: acute illness or injury    Amount and/or Complexity of Data Reviewed  External Data Reviewed: notes.     Details: Prior hospitalization with laparoscopic appendectomy 3-31 for acute appendicitis without complication  Labs: ordered.  Radiology: ordered. Decision-making details documented in ED Course.    Risk  Prescription drug management.          Disposition  Final diagnoses:   Abdominal pain     Time reflects when diagnosis was documented in both MDM as applicable and the Disposition within this note       Time User Action Codes Description Comment    4/28/2024 11:33 AM Riddhi Gupta [R10.9] Abdominal pain           ED Disposition       ED Disposition   Discharge    Condition   Stable    Date/Time   Sun Apr 28,  2024 1133    Comment   David Up discharge to home/self care.                   Follow-up Information       Follow up With Specialties Details Why Contact Info Additional Information    [facility] Call   [address] [address] [phone] [facility], [address] , [facility] [address], [place], [address], [phone]    [facility] Emergency Department Emergency Medicine Go to  If symptoms worsen [address] [facility] [place] [address] [phone] [facility], [address], [place], [address] [phone]            Discharge Medication List as of 4/28/2024 11:41 AM        START taking these medications    Details   gabapentin (Neurontin) 100 mg capsule Take 1 capsule (100 mg total) by mouth 3 (three) times a day for 7 days, Starting Sun 4/28/2024, Until Sun 5/5/2024, Normal           CONTINUE these medications which have NOT CHANGED    Details   acetaminophen (TYLENOL) 325 mg tablet Take 2 tablets (650 mg total) by mouth every 6 (six) hours as needed for mild pain, Starting Mon 4/1/2024, No Print           No discharge procedures on file.    PDMP Review         Value Time User    PDMP Reviewed  Yes 4/1/2024  8:28 AM Kojo Daugherty PA-C             ED Provider  Attending physically available and evaluated David Up. I managed the patient along with the ED Attending.    Electronically Signed by           Riddhi Lozoya DO  05/01/24 1127

## 2024-05-01 NOTE — TELEPHONE ENCOUNTER
PA for lidocaine Denied    Reason:(Screenshot if applicable)    Outcome Denied Medical Denial    Message sent to office clinical pool Yes    Denial letter scanned into Media No Denial via CMM  Key: [id number] - PA Case ID: [id number]    Appeal started No ( Provider will need to decide if appeal is warranted and send clinical documentation to PA team for initiation.)

## 2025-03-04 ENCOUNTER — OFFICE VISIT (OUTPATIENT)
Dept: FAMILY MEDICINE CLINIC | Facility: CLINIC | Age: 30
End: 2025-03-04
Payer: COMMERCIAL

## 2025-03-04 VITALS
RESPIRATION RATE: 18 BRPM | TEMPERATURE: 98.2 F | BODY MASS INDEX: 26.9 KG/M2 | OXYGEN SATURATION: 97 % | SYSTOLIC BLOOD PRESSURE: 120 MMHG | WEIGHT: 167.4 LBS | HEART RATE: 84 BPM | DIASTOLIC BLOOD PRESSURE: 84 MMHG | HEIGHT: 66 IN

## 2025-03-04 DIAGNOSIS — Z11.59 ENCOUNTER FOR HEPATITIS C SCREENING TEST FOR LOW RISK PATIENT: Primary | ICD-10-CM

## 2025-03-04 DIAGNOSIS — R21 RASH: ICD-10-CM

## 2025-03-04 DIAGNOSIS — Z23 ENCOUNTER FOR IMMUNIZATION: ICD-10-CM

## 2025-03-04 DIAGNOSIS — Z11.4 ENCOUNTER FOR SCREENING FOR HUMAN IMMUNODEFICIENCY VIRUS (HIV): ICD-10-CM

## 2025-03-04 DIAGNOSIS — Z13.6 SCREENING FOR CARDIOVASCULAR CONDITION: ICD-10-CM

## 2025-03-04 DIAGNOSIS — Z00.00 ANNUAL PHYSICAL EXAM: ICD-10-CM

## 2025-03-04 PROCEDURE — 90471 IMMUNIZATION ADMIN: CPT

## 2025-03-04 PROCEDURE — 99385 PREV VISIT NEW AGE 18-39: CPT | Performed by: NURSE PRACTITIONER

## 2025-03-04 PROCEDURE — 90656 IIV3 VACC NO PRSV 0.5 ML IM: CPT

## 2025-03-04 RX ORDER — RIBOFLAVIN (VITAMIN B2) 100 MG
100 TABLET ORAL DAILY
COMMUNITY

## 2025-03-04 RX ORDER — DIPHENOXYLATE HYDROCHLORIDE AND ATROPINE SULFATE 2.5; .025 MG/1; MG/1
1 TABLET ORAL DAILY
COMMUNITY

## 2025-03-04 RX ORDER — OMEGA-3S/DHA/EPA/FISH OIL/D3 300MG-1000
400 CAPSULE ORAL DAILY
COMMUNITY

## 2025-03-04 NOTE — ASSESSMENT & PLAN NOTE
BMI in the office 27.  The patient feels eats a fairly healthy diet.  He is active.  Continue with healthy diet and exercise.

## 2025-03-04 NOTE — PROGRESS NOTES
Name: David Up      : 1995      MRN: [id number]  Encounter Provider: BRAULIO Carranza  Encounter Date: 3/4/2025   Encounter department: [facility]    Assessment & Plan  Encounter for hepatitis C screening test for low risk patient         Encounter for screening for human immunodeficiency virus (HIV)    Orders:    HIV 1/2 AG/AB w Reflex [facility] for 2 yr old and above; Future    Hepatitis C antibody; Future    Annual physical exam    Orders:    CBC and differential; Future    Lipid Panel with Direct LDL reflex; Future    Basic metabolic panel; Future    Screening for cardiovascular condition    Orders:    Lipid Panel with Direct LDL reflex; Future    Encounter for immunization    Orders:    influenza vaccine preservative-free 0.5 mL IM (Fluzone, Afluria, Fluarix, Flulaval)    Rash  Patient concerned with lumps that come and go in his bilateral axilla.  Patient states it does hurt at times.  No itching.  Patient has not changed his deodorant however he states he is using different scents from time to time.  Upon exam in his left  axilla patient has several small raised red areas.  These do not resemble pimples.  They are not tender in the office today.  I did recommend he try a different deodorant that is unscented.  He will continue to monitor this.         BMI 27.0-27.9,adult  BMI in the office 27.  The patient feels eats a fairly healthy diet.  He is active.  Continue with healthy diet and exercise.           Depression Screening and Follow-up Plan: Patient was screened for depression during today's encounter. They screened negative with a PHQ-2 score of 0.          History of Present Illness     Presents to the office today to establish care.  Past medical history was updated.  He has a new concern of small lumps which appear in his bilateral axilla.  They come and go.  At times they are painful.  There is no discharge from these areas.  Has a history of cluster headaches  however these have resolved.  He is interested in the flu vaccine today.          Review of Systems   Constitutional: Negative.  Negative for fatigue.   HENT: Negative.  Negative for congestion, postnasal drip, rhinorrhea and trouble swallowing.    Eyes: Negative.  Negative for visual disturbance.   Respiratory: Negative.  Negative for choking and shortness of breath.    Cardiovascular: Negative.  Negative for chest pain.   Gastrointestinal: Negative.    Endocrine: Negative.    Genitourinary: Negative.    Musculoskeletal: Negative.  Negative for arthralgias, back pain, myalgias and neck pain.   Skin:  Positive for rash.   Neurological:  Negative for dizziness and headaches.   Psychiatric/Behavioral: Negative.       Past Medical History:   Diagnosis Date    Hx of migraines      Past Surgical History:   Procedure Laterality Date    APPENDECTOMY LAPAROSCOPIC N/A 3/31/2024    Procedure: APPENDECTOMY LAPAROSCOPIC;  Surgeon: Lauryn Ullrich, DO;  Location: [facility];  Service: General     History reviewed. No pertinent family history.  Social History     Tobacco Use    Smoking status: Former     Types: Cigarettes     Passive exposure: Never    Smokeless tobacco: Current     Types: Chew   Vaping Use    Vaping status: Never Used   Substance and Sexual Activity    Alcohol use: Yes     Alcohol/week: 3.0 standard drinks of alcohol     Types: 1 Glasses of wine, 1 Cans of beer, 1 Shots of liquor per week    Drug use: Never    Sexual activity: Yes     Partners: Female     Current Outpatient Medications on File Prior to Visit   Medication Sig    acetaminophen (TYLENOL) 325 mg tablet Take 2 tablets (650 mg total) by mouth every 6 (six) hours as needed for mild pain (Patient not taking: Reported on 3/4/2025)    gabapentin (Neurontin) 100 mg capsule Take 1 capsule (100 mg total) by mouth 3 (three) times a day for 7 days    lidocaine (Lidoderm) 5 % Apply 1 patch topically over 12 hours daily Remove & Discard patch within 12 hours or  "as directed by MD (Patient not taking: Reported on 3/4/2025)    methocarbamol (ROBAXIN) 500 mg tablet Take 1 tablet (500 mg total) by mouth 4 (four) times a day (Patient not taking: Reported on 3/4/2025)     No Known Allergies  Immunization History   Administered Date(s) Administered    COVID-19 PFIZER VACCINE 0.3 ML IM 03/16/2021     Objective   /84   Pulse 84   Temp 98.2 °F (36.8 °C) (Temporal)   Resp 18   Ht 5' 6\" (1.676 m)   Wt 75.9 kg (167 lb 6.4 oz)   SpO2 97%   BMI 27.02 kg/m²     Physical Exam  Vitals and nursing note reviewed.   Constitutional:       Appearance: Normal appearance. He is well-developed and normal weight.   HENT:      Head: Normocephalic and atraumatic.      Right Ear: Tympanic membrane, ear canal and external ear normal. There is no impacted cerumen.      Left Ear: Tympanic membrane, ear canal and external ear normal. There is no impacted cerumen.      Nose: Nose normal.      Mouth/Throat:      Mouth: Mucous membranes are moist.      Pharynx: Oropharynx is clear.   Eyes:      Conjunctiva/sclera: Conjunctivae normal.   Cardiovascular:      Rate and Rhythm: Normal rate and regular rhythm.      Pulses: Normal pulses.      Heart sounds: Normal heart sounds. No murmur heard.  Pulmonary:      Effort: Pulmonary effort is normal. No respiratory distress.      Breath sounds: Normal breath sounds.   Abdominal:      General: Bowel sounds are normal. There is no distension.      Palpations: Abdomen is soft. There is no mass.      Tenderness: There is no abdominal tenderness. There is no guarding or rebound.      Hernia: No hernia is present.   Musculoskeletal:         General: Normal range of motion.      Cervical back: Normal range of motion and neck supple.   Skin:     General: Skin is warm and dry.      Findings: Rash (Left axilla) present.   Neurological:      General: No focal deficit present.      Mental Status: He is alert and oriented to person, place, and time. Mental status is at " baseline.   Psychiatric:         Mood and Affect: Mood normal.         Behavior: Behavior normal.         Thought Content: Thought content normal.         Judgment: Judgment normal.

## 2025-08-08 ENCOUNTER — APPOINTMENT (OUTPATIENT)
Dept: LAB | Facility: CLINIC | Age: 30
End: 2025-08-08
Attending: NURSE PRACTITIONER
Payer: COMMERCIAL

## (undated) DEVICE — SYRINGE 10ML LL

## (undated) DEVICE — TROCAR: Brand: KII FIOS FIRST ENTRY

## (undated) DEVICE — THE ECHELON, ECHELON ENDOPATH™ AND ECHELON FLEX™ FAMILIES OF ENDOSCOPIC LINEAR CUTTERS AND RELOADS ARE STERILE, SINGLE PATIENT USE INSTRUMENTS THAT SIMULTANEOUSLY CUT AND STAPLE TISSUE. THERE ARE SIX STAGGERED ROWS OF STAPLES, THREE ON EITHER SIDE OF THE CUT LINE. THE 45 MM INSTRUMENTS HAVE A STAPLE LINE THATIS APPROXIMATELY 45 MM LONG AND A CUT LINE THAT IS APPROXIMATELY 42 MM LONG. THE SHAFT CAN ROTATE FREELY IN BOTH DIRECTIONS AND AN ARTICULATION MECHANISM ON ARTICULATING INSTRUMENTS ENABLES BENDING THE DISTAL PORTIONOF THE SHAFT TO FACILITATE LATERAL ACCESS OF THE OPERATIVE SITE.THE INSTRUMENTS ARE SHIPPED WITHOUT A RELOAD AND MUST BE LOADED PRIOR TO USE. A STAPLE RETAINING CAP ON THE RELOAD PROTECTS THE STAPLE LEG POINTS DURING SHIPPING AND TRANSPORTATION. THE INSTRUMENTS’ LOCK-OUT FEATURE IS DESIGNED TO PREVENT A USED RELOAD FROM BEING REFIRED.: Brand: ECHELON ENDOPATH

## (undated) DEVICE — PACK PBDS LAP CHOLE RF

## (undated) DEVICE — SUT MONOCRYL 4-0 PS-2 18 IN Y496G

## (undated) DEVICE — METZENBAUM ADTEC SINGLE USE DISSECTING SCISSORS, SHAFT ONLY, MONOPOLAR, CURVED TO LEFT, WORKING LENGTH: 12 1/4", (310 MM), DIAM. 5 MM, INSULATED, DOUBLE ACTION, STERILE, DISPOSABLE, PACKAGE OF 10 PIECES: Brand: AESCULAP

## (undated) DEVICE — Device: Brand: OMNICLOSE TROCAR SITE CLOSURE DEVICE

## (undated) DEVICE — TRAY FOLEY 16FR URIMETER SURESTEP

## (undated) DEVICE — TISSUE RETRIEVAL SYSTEM: Brand: INZII RETRIEVAL SYSTEM

## (undated) DEVICE — GLOVE SRG BIOGEL 6

## (undated) DEVICE — GLOVE INDICATOR UNDERGLOVE SZ 6 BLUE

## (undated) DEVICE — SUT VICRYL PLUS 0 UR-6 27IN VCP603H

## (undated) DEVICE — ADHESIVE SKIN HIGH VISCOSITY EXOFIN 1ML

## (undated) DEVICE — TROCARS: Brand: KII® BALLOON BLUNT TIP SYSTEM

## (undated) DEVICE — INSUFFLATION NEEDLE TO ESTABLISH PNEUMOPERITONEUM.: Brand: INSUFFLATION NEEDLE

## (undated) DEVICE — THE ECHELON FLEX POWERED PLUS ARTICULATING ENDOSCOPIC LINEAR CUTTERS ARE STERILE, SINGLE PATIENT USE INSTRUMENTS THAT SIMULTANEOUSLYCUT AND STAPLE TISSUE. THERE ARE SIX STAGGERED ROWS OF STAPLES, THREE ON EITHER SIDE OF THE CUT LINE. THE ECHELON FLEX 45 POWERED PLUSINSTRUMENTS HAVE A STAPLE LINE THAT IS APPROXIMATELY 45 MM LONG AND A CUT LINE THAT IS APPROXIMATELY 42 MM LONG. THE SHAFT CAN ROTATE FREELYIN BOTH DIRECTIONS AND AN ARTICULATION MECHANISM ENABLES THE DISTAL PORTION OF THE SHAFT TO PIVOT TO FACILITATE LATERAL ACCESS TO THE OPERATIVESITE.THE INSTRUMENTS ARE PACKAGED WITH A PRIMARY LITHIUM BATTERY PACK THAT MUST BE INSTALLED PRIOR TO USE. THERE ARE SPECIFIC REQUIREMENTS FORDISPOSING OF THE BATTERY PACK. REFER TO THE BATTERY PACK DISPOSAL SECTION.THE INSTRUMENTS ARE PACKAGED WITHOUT A RELOAD AND MUST BE LOADED PRIOR TO USE. A STAPLE RETAINING CAP ON THE RELOAD PROTECTS THE STAPLE LEGPOINTS DURING SHIPPING AND TRANSPORTATION. THE INSTRUMENTS’ LOCK-OUT FEATURE IS DESIGNED TO PREVENT A USED OR IMPROPERLY INSTALLED RELOADFROM BEING REFIRED OR AN INSTRUMENT FROM BEING FIRED WITHOUT A RELOAD.: Brand: ECHELON FLEX

## (undated) DEVICE — PENCIL ELECTROSURG E-Z CLEAN -0035H